# Patient Record
Sex: MALE | Employment: FULL TIME | URBAN - METROPOLITAN AREA
[De-identification: names, ages, dates, MRNs, and addresses within clinical notes are randomized per-mention and may not be internally consistent; named-entity substitution may affect disease eponyms.]

---

## 2019-09-07 ENCOUNTER — HOSPITAL ENCOUNTER (INPATIENT)
Age: 64
LOS: 13 days | Discharge: HOME HEALTH | DRG: 052 | End: 2019-09-20
Attending: EMERGENCY MEDICINE | Admitting: FAMILY MEDICINE
Payer: MEDICAID

## 2019-09-07 ENCOUNTER — APPOINTMENT (OUTPATIENT)
Dept: GENERAL RADIOLOGY | Age: 64
DRG: 052 | End: 2019-09-07
Attending: FAMILY MEDICINE
Payer: MEDICAID

## 2019-09-07 ENCOUNTER — APPOINTMENT (OUTPATIENT)
Dept: GENERAL RADIOLOGY | Age: 64
DRG: 052 | End: 2019-09-07
Attending: EMERGENCY MEDICINE
Payer: MEDICAID

## 2019-09-07 ENCOUNTER — APPOINTMENT (OUTPATIENT)
Dept: CT IMAGING | Age: 64
DRG: 052 | End: 2019-09-07
Attending: INTERNAL MEDICINE
Payer: MEDICAID

## 2019-09-07 ENCOUNTER — APPOINTMENT (OUTPATIENT)
Dept: VASCULAR SURGERY | Age: 64
DRG: 052 | End: 2019-09-07
Attending: EMERGENCY MEDICINE
Payer: MEDICAID

## 2019-09-07 ENCOUNTER — APPOINTMENT (OUTPATIENT)
Dept: CT IMAGING | Age: 64
DRG: 052 | End: 2019-09-07
Attending: EMERGENCY MEDICINE
Payer: MEDICAID

## 2019-09-07 DIAGNOSIS — M10.9 ACUTE GOUT OF RIGHT HAND, UNSPECIFIED CAUSE: ICD-10-CM

## 2019-09-07 DIAGNOSIS — G93.41 METABOLIC ENCEPHALOPATHY: ICD-10-CM

## 2019-09-07 DIAGNOSIS — I82.4Y9 DEEP VEIN THROMBOSIS (DVT) OF PROXIMAL LOWER EXTREMITY, UNSPECIFIED CHRONICITY, UNSPECIFIED LATERALITY (HCC): ICD-10-CM

## 2019-09-07 DIAGNOSIS — A41.9 SEPSIS, DUE TO UNSPECIFIED ORGANISM: Primary | ICD-10-CM

## 2019-09-07 LAB
ALBUMIN SERPL-MCNC: 2.3 G/DL (ref 3.4–5)
ALBUMIN/GLOB SERPL: 0.4 {RATIO} (ref 0.8–1.7)
ALP SERPL-CCNC: 100 U/L (ref 45–117)
ALT SERPL-CCNC: 33 U/L (ref 16–61)
ANION GAP SERPL CALC-SCNC: 13 MMOL/L (ref 3–18)
APPEARANCE UR: ABNORMAL
AST SERPL-CCNC: 47 U/L (ref 10–38)
BACTERIA URNS QL MICRO: ABNORMAL /HPF
BASOPHILS # BLD: 0 K/UL (ref 0–0.06)
BASOPHILS NFR BLD: 0 % (ref 0–3)
BILIRUB SERPL-MCNC: 1.5 MG/DL (ref 0.2–1)
BILIRUB UR QL: ABNORMAL
BUN SERPL-MCNC: 51 MG/DL (ref 7–18)
BUN/CREAT SERPL: 22 (ref 12–20)
CALCIUM SERPL-MCNC: 9.8 MG/DL (ref 8.5–10.1)
CHLORIDE SERPL-SCNC: 100 MMOL/L (ref 100–111)
CK MB CFR SERPL CALC: 0.5 % (ref 0–4)
CK MB SERPL-MCNC: 1.6 NG/ML (ref 5–25)
CK SERPL-CCNC: 292 U/L (ref 39–308)
CO2 SERPL-SCNC: 24 MMOL/L (ref 21–32)
COLOR UR: ABNORMAL
CREAT SERPL-MCNC: 2.31 MG/DL (ref 0.6–1.3)
DIFFERENTIAL METHOD BLD: ABNORMAL
EOSINOPHIL # BLD: 0 K/UL (ref 0–0.4)
EOSINOPHIL NFR BLD: 0 % (ref 0–5)
EPITH CASTS URNS QL MICRO: ABNORMAL /LPF (ref 0–5)
ERYTHROCYTE [DISTWIDTH] IN BLOOD BY AUTOMATED COUNT: 15.2 % (ref 11.6–14.5)
GLOBULIN SER CALC-MCNC: 6.5 G/DL (ref 2–4)
GLUCOSE BLD STRIP.AUTO-MCNC: 151 MG/DL (ref 70–110)
GLUCOSE SERPL-MCNC: 150 MG/DL (ref 74–99)
GLUCOSE UR STRIP.AUTO-MCNC: NEGATIVE MG/DL
HCT VFR BLD AUTO: 24.7 % (ref 36–48)
HGB BLD-MCNC: 8.1 G/DL (ref 13–16)
HGB UR QL STRIP: NEGATIVE
HYALINE CASTS URNS QL MICRO: ABNORMAL /LPF (ref 0–2)
KETONES UR QL STRIP.AUTO: NEGATIVE MG/DL
LACTATE BLD-SCNC: 1.19 MMOL/L (ref 0.4–2)
LACTATE BLD-SCNC: 2.58 MMOL/L (ref 0.4–2)
LEUKOCYTE ESTERASE UR QL STRIP.AUTO: NEGATIVE
LYMPHOCYTES # BLD: 0.6 K/UL (ref 0.8–3.5)
LYMPHOCYTES NFR BLD: 3 % (ref 20–51)
MCH RBC QN AUTO: 28.5 PG (ref 24–34)
MCHC RBC AUTO-ENTMCNC: 32.8 G/DL (ref 31–37)
MCV RBC AUTO: 87 FL (ref 74–97)
MONOCYTES # BLD: 2.4 K/UL (ref 0–1)
MONOCYTES NFR BLD: 13 % (ref 2–9)
NEUTS SEG # BLD: 15.6 K/UL (ref 1.8–8)
NEUTS SEG NFR BLD: 84 % (ref 42–75)
NITRITE UR QL STRIP.AUTO: NEGATIVE
PH UR STRIP: 5 [PH] (ref 5–8)
PLATELET # BLD AUTO: 841 K/UL (ref 135–420)
PLATELET COMMENTS,PCOM: ABNORMAL
PMV BLD AUTO: 10.1 FL (ref 9.2–11.8)
POTASSIUM SERPL-SCNC: 5.3 MMOL/L (ref 3.5–5.5)
PROT SERPL-MCNC: 8.8 G/DL (ref 6.4–8.2)
PROT UR STRIP-MCNC: 30 MG/DL
RBC # BLD AUTO: 2.84 M/UL (ref 4.7–5.5)
RBC #/AREA URNS HPF: ABNORMAL /HPF (ref 0–5)
RBC MORPH BLD: ABNORMAL
SODIUM SERPL-SCNC: 137 MMOL/L (ref 136–145)
SP GR UR REFRACTOMETRY: 1.02 (ref 1–1.03)
TROPONIN I SERPL-MCNC: <0.02 NG/ML (ref 0–0.04)
UROBILINOGEN UR QL STRIP.AUTO: 4 EU/DL (ref 0.2–1)
WBC # BLD AUTO: 18.6 K/UL (ref 4.6–13.2)
WBC URNS QL MICRO: ABNORMAL /HPF (ref 0–4)

## 2019-09-07 PROCEDURE — 80053 COMPREHEN METABOLIC PANEL: CPT

## 2019-09-07 PROCEDURE — 96365 THER/PROPH/DIAG IV INF INIT: CPT

## 2019-09-07 PROCEDURE — 93005 ELECTROCARDIOGRAM TRACING: CPT

## 2019-09-07 PROCEDURE — 74011250636 HC RX REV CODE- 250/636: Performed by: EMERGENCY MEDICINE

## 2019-09-07 PROCEDURE — 74011000258 HC RX REV CODE- 258: Performed by: EMERGENCY MEDICINE

## 2019-09-07 PROCEDURE — 74011250637 HC RX REV CODE- 250/637: Performed by: EMERGENCY MEDICINE

## 2019-09-07 PROCEDURE — 71045 X-RAY EXAM CHEST 1 VIEW: CPT

## 2019-09-07 PROCEDURE — 70450 CT HEAD/BRAIN W/O DYE: CPT

## 2019-09-07 PROCEDURE — 74011250636 HC RX REV CODE- 250/636: Performed by: INTERNAL MEDICINE

## 2019-09-07 PROCEDURE — 30233N1 TRANSFUSION OF NONAUTOLOGOUS RED BLOOD CELLS INTO PERIPHERAL VEIN, PERCUTANEOUS APPROACH: ICD-10-PCS | Performed by: EMERGENCY MEDICINE

## 2019-09-07 PROCEDURE — 83605 ASSAY OF LACTIC ACID: CPT

## 2019-09-07 PROCEDURE — 71250 CT THORAX DX C-: CPT

## 2019-09-07 PROCEDURE — 73700 CT LOWER EXTREMITY W/O DYE: CPT

## 2019-09-07 PROCEDURE — 99285 EMERGENCY DEPT VISIT HI MDM: CPT

## 2019-09-07 PROCEDURE — 93971 EXTREMITY STUDY: CPT

## 2019-09-07 PROCEDURE — 85025 COMPLETE CBC W/AUTO DIFF WBC: CPT

## 2019-09-07 PROCEDURE — 96360 HYDRATION IV INFUSION INIT: CPT

## 2019-09-07 PROCEDURE — 96366 THER/PROPH/DIAG IV INF ADDON: CPT

## 2019-09-07 PROCEDURE — 87040 BLOOD CULTURE FOR BACTERIA: CPT

## 2019-09-07 PROCEDURE — 82962 GLUCOSE BLOOD TEST: CPT

## 2019-09-07 PROCEDURE — 81001 URINALYSIS AUTO W/SCOPE: CPT

## 2019-09-07 PROCEDURE — 96361 HYDRATE IV INFUSION ADD-ON: CPT

## 2019-09-07 PROCEDURE — 96375 TX/PRO/DX INJ NEW DRUG ADDON: CPT

## 2019-09-07 PROCEDURE — 71046 X-RAY EXAM CHEST 2 VIEWS: CPT

## 2019-09-07 PROCEDURE — 65660000000 HC RM CCU STEPDOWN

## 2019-09-07 PROCEDURE — 82550 ASSAY OF CK (CPK): CPT

## 2019-09-07 RX ORDER — SODIUM CHLORIDE 0.9 % (FLUSH) 0.9 %
5-10 SYRINGE (ML) INJECTION AS NEEDED
Status: DISCONTINUED | OUTPATIENT
Start: 2019-09-07 | End: 2019-09-20 | Stop reason: HOSPADM

## 2019-09-07 RX ORDER — OXYCODONE AND ACETAMINOPHEN 5; 325 MG/1; MG/1
1 TABLET ORAL
Status: COMPLETED | OUTPATIENT
Start: 2019-09-07 | End: 2019-09-07

## 2019-09-07 RX ORDER — FUROSEMIDE 40 MG/1
40 TABLET ORAL 2 TIMES DAILY
COMMUNITY
End: 2019-09-20

## 2019-09-07 RX ORDER — LISINOPRIL 20 MG/1
20 TABLET ORAL DAILY
COMMUNITY
End: 2019-09-20

## 2019-09-07 RX ORDER — ONDANSETRON 2 MG/ML
4 INJECTION INTRAMUSCULAR; INTRAVENOUS
Status: DISCONTINUED | OUTPATIENT
Start: 2019-09-07 | End: 2019-09-20 | Stop reason: HOSPADM

## 2019-09-07 RX ORDER — TRAMADOL HYDROCHLORIDE 50 MG/1
50 TABLET ORAL
Status: ON HOLD | COMMUNITY
End: 2019-09-18 | Stop reason: SDUPTHER

## 2019-09-07 RX ORDER — CARVEDILOL 25 MG/1
25 TABLET ORAL 2 TIMES DAILY WITH MEALS
COMMUNITY
End: 2019-09-20

## 2019-09-07 RX ORDER — VANCOMYCIN/0.9 % SOD CHLORIDE 1 G/100 ML
1000 PLASTIC BAG, INJECTION (ML) INTRAVENOUS
Status: COMPLETED | OUTPATIENT
Start: 2019-09-07 | End: 2019-09-07

## 2019-09-07 RX ORDER — ACETAMINOPHEN 325 MG/1
650 TABLET ORAL
Status: DISCONTINUED | OUTPATIENT
Start: 2019-09-07 | End: 2019-09-20 | Stop reason: HOSPADM

## 2019-09-07 RX ADMIN — OXYCODONE HYDROCHLORIDE AND ACETAMINOPHEN 1 TABLET: 5; 325 TABLET ORAL at 16:11

## 2019-09-07 RX ADMIN — SODIUM CHLORIDE 250 ML: 9 INJECTION, SOLUTION INTRAVENOUS at 14:59

## 2019-09-07 RX ADMIN — VANCOMYCIN HYDROCHLORIDE 1000 MG: 10 INJECTION, POWDER, LYOPHILIZED, FOR SOLUTION INTRAVENOUS at 15:39

## 2019-09-07 RX ADMIN — SODIUM CHLORIDE 1000 ML: 900 INJECTION, SOLUTION INTRAVENOUS at 19:55

## 2019-09-07 RX ADMIN — SODIUM CHLORIDE 1000 ML: 900 INJECTION, SOLUTION INTRAVENOUS at 23:11

## 2019-09-07 RX ADMIN — SODIUM CHLORIDE 1000 ML: 900 INJECTION, SOLUTION INTRAVENOUS at 21:58

## 2019-09-07 RX ADMIN — PIPERACILLIN AND TAZOBACTAM 4.5 G: 4; .5 INJECTION, POWDER, LYOPHILIZED, FOR SOLUTION INTRAVENOUS; PARENTERAL at 15:08

## 2019-09-07 RX ADMIN — SODIUM CHLORIDE 1000 ML: 900 INJECTION, SOLUTION INTRAVENOUS at 20:45

## 2019-09-07 NOTE — ED TRIAGE NOTES
Pt arrived via ems from group [facility] [place].  Unconscious upon arrival, once the pt was being transferred to the stretcher he woke up and a/o X 4

## 2019-09-07 NOTE — ED PROVIDER NOTES
EMERGENCY DEPARTMENT HISTORY AND PHYSICAL EXAM    3:10 PM  Date: 9/7/2019  Patient Name: Glyn Krabbe    History of Presenting Illness     Chief Complaint   Patient presents with    Altered mental status       History Provided By: Patient and EMS    HPI: Glyn Krabbe is a 60 y.o. male with past medical history of MICK, DVT on Eliquis PE, CKD, CHF presents with an episode of unresponsiveness this morning from group home patient was found well by EMS however his mental status awake waxing and waning according to the patient he is he knows his name and the year but he does not know where he is. Easily he was hypertensive in the emergency department. Denies any pain. Limited history from patient, nurse called group home but unable to obtain more info. PCP: None    Past History     Past Medical History:  No past medical history on file. Past Surgical History:  No past surgical history on file. Family History:  No family history on file. Social History:  Social History     Tobacco Use    Smoking status: Not on file   Substance Use Topics    Alcohol use: Not on file    Drug use: Not on file       Allergies:  No Known Allergies    Review of Systems   Review of Systems   Constitutional: Negative for activity change, appetite change and chills. HENT: Negative for congestion, ear discharge, ear pain and sore throat. Eyes: Negative for photophobia and pain. Respiratory: Negative for cough and choking. Cardiovascular: Negative for palpitations and leg swelling. Gastrointestinal: Negative for anal bleeding and rectal pain. Endocrine: Negative for polydipsia and polyuria. Genitourinary: Negative for genital sores and urgency. Musculoskeletal: Negative for arthralgias and myalgias. Neurological: Negative for dizziness, seizures and speech difficulty. Psychiatric/Behavioral: Positive for confusion. Negative for hallucinations, self-injury and suicidal ideas.         Physical Exam Patient Vitals for the past 12 hrs:   Temp Pulse Resp BP SpO2   09/08/19 1938 97.2 °F (36.2 °C) 76 18 104/62 98 %   09/08/19 1438 97.6 °F (36.4 °C) 76 18 123/68 98 %   09/08/19 1415 97.4 °F (36.3 °C) 77 18 127/74 99 %   09/08/19 1311 97.4 °F (36.3 °C) 74 16 102/59 99 %   09/08/19 1248 98.7 °F (37.1 °C) 75 16 98/60 98 %   09/08/19 1215 97.3 °F (36.3 °C) 70 16 117/67 96 %   09/08/19 1202 97 °F (36.1 °C) 70 16 100/60 95 %   09/08/19 1146 98.8 °F (37.1 °C) 71 16 107/62 97 %   09/08/19 1124 98.5 °F (36.9 °C) 72 16 100/59 96 %       Physical Exam   Constitutional: He appears well-developed and well-nourished. HENT:   Head: Normocephalic and atraumatic. Eyes: Right eye exhibits no discharge. Left eye exhibits no discharge. Neck: Normal range of motion. Neck supple. Cardiovascular: Normal rate, regular rhythm, normal heart sounds and intact distal pulses. No murmur heard. Pulmonary/Chest: Effort normal and breath sounds normal. No stridor. No respiratory distress. He has no wheezes. He has no rales. He exhibits no tenderness. Abdominal: Soft. Bowel sounds are normal. He exhibits no distension. There is no tenderness. There is no rebound and no guarding. Musculoskeletal: Normal range of motion. He exhibits edema. R calf hard and edematous  Mass on medial thigh- deep 10*10   Neurological: He is alert. No cranial nerve deficit. Intermittently confused   Skin: Skin is warm and dry. Psychiatric: He has a normal mood and affect. Nursing note and vitals reviewed.       Diagnostic Study Results     Labs -  Recent Results (from the past 12 hour(s))   PROTHROMBIN TIME + INR    Collection Time: 09/08/19  4:01 PM   Result Value Ref Range    Prothrombin time 18.1 (H) 11.5 - 15.2 sec    INR 1.5 (H) 0.8 - 1.2     HGB & HCT    Collection Time: 09/08/19  4:01 PM   Result Value Ref Range    HGB 7.1 (L) 13.0 - 16.0 g/dL    HCT 22.6 (L) 36.0 - 48.0 %   URINALYSIS W/MICROSCOPIC    Collection Time: 09/08/19  4:01 PM Result Value Ref Range    Color DARK YELLOW      Appearance CLEAR      Specific gravity 1.013 1.005 - 1.030      pH (UA) 5.0 5.0 - 8.0      Protein NEGATIVE  NEG mg/dL    Glucose NEGATIVE  NEG mg/dL    Ketone NEGATIVE  NEG mg/dL    Bilirubin NEGATIVE  NEG      Blood NEGATIVE  NEG      Urobilinogen 2.0 (H) 0.2 - 1.0 EU/dL    Nitrites NEGATIVE  NEG      Leukocyte Esterase NEGATIVE  NEG      WBC PENDING /hpf    RBC PENDING /hpf    Epithelial cells PENDING /lpf    Bacteria PENDING /hpf   CHLORIDE, URINE RANDOM    Collection Time: 09/08/19  4:01 PM   Result Value Ref Range    Chloride,urine random 73 55 - 125 MMOL/L   SODIUM, UR, RANDOM    Collection Time: 09/08/19  4:01 PM   Result Value Ref Range    Sodium,urine random 76 20 - 110 MMOL/L   CREATININE, UR, RANDOM    Collection Time: 09/08/19  4:01 PM   Result Value Ref Range    Creatinine, urine 71.00 30 - 125 mg/dL       Radiologic Studies -       Medical Decision Making     ED Course: Progress Notes, Reevaluation, and Consults:    3:10 PM Initial assessment performed. The patients presenting problems have been discussed, and they/their family are in agreement with the care plan formulated and outlined with them. I have encouraged them to ask questions as they arise throughout their visit. Provider Notes (Medical Decision Making): Patient initially hypotensive on arrival.  Improved after 250 mL's of IV fluids. However he is intermittently confused. We are going to do a septic screen also we are going to check for his right leg for DVT. Will initiate antibiotic treatment and do a CT of his head   Patient with elevated WCC of 18 and lactic acid. Unclear source; CT head no acute intracranial abnormality  R DVT- of indeterminate age, patient already on eliquis  Medial thigh mass- hemorrhagic vs abscess  Patient will be admitted for sepsis and metabolic encephalopathy  Dr. Darling Erickson aware  Vital Signs-Reviewed the patient's vital signs. Reviewed pt's pulse ox reading. EKG:  Interpreted by the EP. Time Interpreted: 14:21   Rate: 79   Rhythm: NSR   Interpretation:LVH, no ST changes      Records Reviewed: old medical records(Time of Review: 3:10 PM)  -I am the first provider for this patient.  -I reviewed the vital signs, available nursing notes, past medical history, past surgical history, family history and social history. Current Facility-Administered Medications   Medication Dose Route Frequency Provider Last Rate Last Dose    0.9% sodium chloride infusion  75 mL/hr IntraVENous CONTINUOUS Wandy Fletcher MD 75 mL/hr at 09/08/19 1445 75 mL/hr at 09/08/19 1445    0.9% sodium chloride infusion 250 mL  250 mL IntraVENous PRN Wandy Fletcher MD   Stopped at 09/08/19 1445    cefepime (MAXIPIME) 2 g in sterile water (preservative free) 10 mL IV syringe  2 g IntraVENous Q12H Sherlyn Paredes MD   2 g at 09/08/19 2130    sodium chloride (NS) flush 5-10 mL  5-10 mL IntraVENous PRN Sanna Jules MD        acetaminophen (TYLENOL) tablet 650 mg  650 mg Oral Q4H PRN Nupur Richardson MD   650 mg at 09/08/19 1445    ondansetron (ZOFRAN) injection 4 mg  4 mg IntraVENous Q4H PRN Nupur Richardson MD        VANCOMYCIN INFORMATION NOTE   Other CONTINUOUS Nupur Richardson MD            Clinical Impression     Clinical Impression:   1. Sepsis, due to unspecified organism ([facility] [address]    2. Metabolic encephalopathy    3.  Deep vein thrombosis (DVT) of proximal lower extremity, unspecified chronicity, unspecified laterality ([facility] [address]        Disposition: Zamzam Bruce MD  3:10 PM

## 2019-09-07 NOTE — ED NOTES
Patient BP checked twice, Both readings low. Hospitalist paged. Spoke with ED doc and informed of BP ok to give bolus of IV fluids.

## 2019-09-08 ENCOUNTER — APPOINTMENT (OUTPATIENT)
Dept: GENERAL RADIOLOGY | Age: 64
DRG: 052 | End: 2019-09-08
Attending: HOSPITALIST
Payer: MEDICAID

## 2019-09-08 LAB
ANION GAP SERPL CALC-SCNC: 12 MMOL/L (ref 3–18)
ATRIAL RATE: 79 BPM
BASOPHILS # BLD: 0 K/UL (ref 0–0.1)
BASOPHILS NFR BLD: 0 % (ref 0–2)
BUN SERPL-MCNC: 61 MG/DL (ref 7–18)
BUN/CREAT SERPL: 25 (ref 12–20)
CALCIUM SERPL-MCNC: 8.6 MG/DL (ref 8.5–10.1)
CALCULATED P AXIS, ECG09: 20 DEGREES
CALCULATED R AXIS, ECG10: -5 DEGREES
CALCULATED T AXIS, ECG11: -3 DEGREES
CHLORIDE SERPL-SCNC: 107 MMOL/L (ref 100–111)
CHLORIDE UR-SCNC: 73 MMOL/L (ref 55–125)
CK SERPL-CCNC: 611 U/L (ref 39–308)
CO2 SERPL-SCNC: 23 MMOL/L (ref 21–32)
CREAT SERPL-MCNC: 2.45 MG/DL (ref 0.6–1.3)
CREAT UR-MCNC: 71 MG/DL (ref 30–125)
DIAGNOSIS, 93000: NORMAL
DIFFERENTIAL METHOD BLD: ABNORMAL
EOSINOPHIL # BLD: 0.1 K/UL (ref 0–0.4)
EOSINOPHIL NFR BLD: 1 % (ref 0–5)
ERYTHROCYTE [DISTWIDTH] IN BLOOD BY AUTOMATED COUNT: 15.7 % (ref 11.6–14.5)
FERRITIN SERPL-MCNC: 598 NG/ML (ref 8–388)
GLUCOSE SERPL-MCNC: 110 MG/DL (ref 74–99)
HBA1C MFR BLD: <3.5 % (ref 4.2–5.6)
HCT VFR BLD AUTO: 20.2 % (ref 36–48)
HCT VFR BLD AUTO: 22.6 % (ref 36–48)
HGB BLD-MCNC: 6.5 G/DL (ref 13–16)
HGB BLD-MCNC: 7.1 G/DL (ref 13–16)
INR PPP: 1.5 (ref 0.8–1.2)
IRON SATN MFR SERPL: 9 %
IRON SERPL-MCNC: 12 UG/DL (ref 50–175)
LYMPHOCYTES # BLD: 1.7 K/UL (ref 0.9–3.6)
LYMPHOCYTES NFR BLD: 11 % (ref 21–52)
MCH RBC QN AUTO: 28.3 PG (ref 24–34)
MCHC RBC AUTO-ENTMCNC: 32.2 G/DL (ref 31–37)
MCV RBC AUTO: 87.8 FL (ref 74–97)
MONOCYTES # BLD: 2.1 K/UL (ref 0.05–1.2)
MONOCYTES NFR BLD: 13 % (ref 3–10)
NEUTS SEG # BLD: 12.1 K/UL (ref 1.8–8)
NEUTS SEG NFR BLD: 75 % (ref 40–73)
P-R INTERVAL, ECG05: 122 MS
PLATELET # BLD AUTO: 698 K/UL (ref 135–420)
PMV BLD AUTO: 9.9 FL (ref 9.2–11.8)
POTASSIUM SERPL-SCNC: 5.1 MMOL/L (ref 3.5–5.5)
PROTHROMBIN TIME: 18.1 SEC (ref 11.5–15.2)
Q-T INTERVAL, ECG07: 380 MS
QRS DURATION, ECG06: 92 MS
QTC CALCULATION (BEZET), ECG08: 435 MS
RBC # BLD AUTO: 2.3 M/UL (ref 4.7–5.5)
SODIUM SERPL-SCNC: 142 MMOL/L (ref 136–145)
SODIUM UR-SCNC: 76 MMOL/L (ref 20–110)
T4 FREE SERPL-MCNC: 1.2 NG/DL (ref 0.7–1.5)
TIBC SERPL-MCNC: 135 UG/DL (ref 250–450)
TSH SERPL DL<=0.05 MIU/L-ACNC: 0.5 UIU/ML (ref 0.36–3.74)
VENTRICULAR RATE, ECG03: 79 BPM
WBC # BLD AUTO: 16.1 K/UL (ref 4.6–13.2)

## 2019-09-08 PROCEDURE — 65660000000 HC RM CCU STEPDOWN

## 2019-09-08 PROCEDURE — 36430 TRANSFUSION BLD/BLD COMPNT: CPT

## 2019-09-08 PROCEDURE — 85610 PROTHROMBIN TIME: CPT

## 2019-09-08 PROCEDURE — 80048 BASIC METABOLIC PNL TOTAL CA: CPT

## 2019-09-08 PROCEDURE — 36415 COLL VENOUS BLD VENIPUNCTURE: CPT

## 2019-09-08 PROCEDURE — 74011250637 HC RX REV CODE- 250/637: Performed by: FAMILY MEDICINE

## 2019-09-08 PROCEDURE — 82550 ASSAY OF CK (CPK): CPT

## 2019-09-08 PROCEDURE — 74011250636 HC RX REV CODE- 250/636: Performed by: EMERGENCY MEDICINE

## 2019-09-08 PROCEDURE — 83935 ASSAY OF URINE OSMOLALITY: CPT

## 2019-09-08 PROCEDURE — 85018 HEMOGLOBIN: CPT

## 2019-09-08 PROCEDURE — 74011250636 HC RX REV CODE- 250/636: Performed by: HOSPITALIST

## 2019-09-08 PROCEDURE — 74011000258 HC RX REV CODE- 258: Performed by: EMERGENCY MEDICINE

## 2019-09-08 PROCEDURE — 86900 BLOOD TYPING SEROLOGIC ABO: CPT

## 2019-09-08 PROCEDURE — 83540 ASSAY OF IRON: CPT

## 2019-09-08 PROCEDURE — 82570 ASSAY OF URINE CREATININE: CPT

## 2019-09-08 PROCEDURE — 84439 ASSAY OF FREE THYROXINE: CPT

## 2019-09-08 PROCEDURE — 74011000250 HC RX REV CODE- 250: Performed by: HOSPITALIST

## 2019-09-08 PROCEDURE — 81001 URINALYSIS AUTO W/SCOPE: CPT

## 2019-09-08 PROCEDURE — 86923 COMPATIBILITY TEST ELECTRIC: CPT

## 2019-09-08 PROCEDURE — 82728 ASSAY OF FERRITIN: CPT

## 2019-09-08 PROCEDURE — 74011250636 HC RX REV CODE- 250/636: Performed by: INTERNAL MEDICINE

## 2019-09-08 PROCEDURE — 83036 HEMOGLOBIN GLYCOSYLATED A1C: CPT

## 2019-09-08 PROCEDURE — 84443 ASSAY THYROID STIM HORMONE: CPT

## 2019-09-08 PROCEDURE — 85025 COMPLETE CBC W/AUTO DIFF WBC: CPT

## 2019-09-08 PROCEDURE — 84300 ASSAY OF URINE SODIUM: CPT

## 2019-09-08 PROCEDURE — 82436 ASSAY OF URINE CHLORIDE: CPT

## 2019-09-08 PROCEDURE — P9016 RBC LEUKOCYTES REDUCED: HCPCS

## 2019-09-08 PROCEDURE — 74011250636 HC RX REV CODE- 250/636: Performed by: FAMILY MEDICINE

## 2019-09-08 RX ORDER — SODIUM CHLORIDE 9 MG/ML
250 INJECTION, SOLUTION INTRAVENOUS AS NEEDED
Status: DISCONTINUED | OUTPATIENT
Start: 2019-09-08 | End: 2019-09-08 | Stop reason: SDUPTHER

## 2019-09-08 RX ORDER — SODIUM CHLORIDE 9 MG/ML
75 INJECTION, SOLUTION INTRAVENOUS CONTINUOUS
Status: DISPENSED | OUTPATIENT
Start: 2019-09-08 | End: 2019-09-09

## 2019-09-08 RX ORDER — FUROSEMIDE 10 MG/ML
20 INJECTION INTRAMUSCULAR; INTRAVENOUS ONCE
Status: COMPLETED | OUTPATIENT
Start: 2019-09-08 | End: 2019-09-08

## 2019-09-08 RX ORDER — SODIUM CHLORIDE 9 MG/ML
250 INJECTION, SOLUTION INTRAVENOUS AS NEEDED
Status: DISCONTINUED | OUTPATIENT
Start: 2019-09-08 | End: 2019-09-15 | Stop reason: SDUPTHER

## 2019-09-08 RX ADMIN — ACETAMINOPHEN 650 MG: 325 TABLET ORAL at 09:35

## 2019-09-08 RX ADMIN — ACETAMINOPHEN 650 MG: 325 TABLET ORAL at 14:45

## 2019-09-08 RX ADMIN — PIPERACILLIN SODIUM AND TAZOBACTAM SODIUM 3.38 G: 3; .375 INJECTION, POWDER, LYOPHILIZED, FOR SOLUTION INTRAVENOUS at 02:06

## 2019-09-08 RX ADMIN — VANCOMYCIN HYDROCHLORIDE 2500 MG: 1 INJECTION, POWDER, LYOPHILIZED, FOR SOLUTION INTRAVENOUS at 02:52

## 2019-09-08 RX ADMIN — SODIUM CHLORIDE 100 ML/HR: 900 INJECTION, SOLUTION INTRAVENOUS at 02:02

## 2019-09-08 RX ADMIN — PIPERACILLIN SODIUM AND TAZOBACTAM SODIUM 3.38 G: 3; .375 INJECTION, POWDER, LYOPHILIZED, FOR SOLUTION INTRAVENOUS at 14:45

## 2019-09-08 RX ADMIN — SODIUM CHLORIDE 250 ML: 900 INJECTION, SOLUTION INTRAVENOUS at 11:26

## 2019-09-08 RX ADMIN — FUROSEMIDE 20 MG: 10 INJECTION, SOLUTION INTRAMUSCULAR; INTRAVENOUS at 14:45

## 2019-09-08 RX ADMIN — PIPERACILLIN SODIUM AND TAZOBACTAM SODIUM 3.38 G: 3; .375 INJECTION, POWDER, LYOPHILIZED, FOR SOLUTION INTRAVENOUS at 09:28

## 2019-09-08 RX ADMIN — CEFEPIME 2 G: 2 INJECTION, POWDER, FOR SOLUTION INTRAVENOUS at 21:30
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
## 2019-09-08 NOTE — PROGRESS NOTES
Spoke to Dr. Tia Causey and made aware of lab results of H/H and INR. Made aware pt refused to go down for x-rays.

## 2019-09-08 NOTE — ROUTINE PROCESS
Bedside and Verbal shift change report given to AARON Last (oncoming nurse) by Lakshmi Sanchez RN (offgoing nurse). Report included the following information SBAR, Kardex, ED Summary, Procedure Summary, Intake/Output, MAR, Recent Results and Cardiac Rhythm NSR.

## 2019-09-08 NOTE — ROUTINE PROCESS
Bedside and Verbal shift change report given to Ondina Brooks RN (oncoming nurse) by Evelin Pina RN (offgoing nurse). Report included the following information SBAR, Kardex, MAR and Recent Results.

## 2019-09-08 NOTE — H&P
Hospitalist Admission Note    NAME: Dede Gonzalez   :  1955   MRN:  [id number]     Date:  2019     Patient PCP: None  ________________________________________________________________________    My assessment of this patient's clinical condition and my plan of care is as follows. Assessment / Plan:  1. Acute encephalopathy, etiology uncertain  2. Hypotension - dehydration vs infection vs medication effect vs other  3. Renal insufficiency, acute vs chronic  4. L thigh fluid collection - hematoma vs infection  5. Age indeterminate RLE DVT - nonocclusive  6. Anemia  7. Thrombocytosis   8. Hyperglycemia  9. On [address] therapy (Eliquis), reportedly for hx of DVT    1. Admit to telemetry bed. 2. IVF for hydration as patient appears dry. Will continue IVF at 100 ml/hr once bolus completed. 3. Empiric IV antibiotic therapy, follow cultures. 4. Despite RLE DVT which is likely chronic, will hold eliquis for now given concern for possible L thigh hematoma. F/U H/H, transfuse if necessary. 5. Thyryoid studies, A1c.  6. Consider upgrading status depending on overall clinical status, especially if hypotension remains an issue. 7. Disposition TBD. Code Status: Full  DVT Prophylaxis: On Eliquis  GI Prophylaxis: Not indicated          Subjective:   CHIEF COMPLAINT: Altered mental status    HISTORY OF PRESENT ILLNESS:     Susana Saucedo is a 60 y.o.  male who presented to the ED this afternoon for evaluation of mental status change. Patient remains confused on my exam and is oriented only to person - history is limited and difficult as a result. Per the ED record, patient was sent in from a local group home for evaluation after he was found to be outside of his normal level of cognition this AM.        PMHx:  Unable to obtain    PSurghx:  Unable to obtain       Social hx:  Unable to obtain     Family hx:  Unable to obtain:    Allergies:  NKDA but unable to verify.     Prior to Admission medications    Medication Sig Start Date End Date Taking? Authorizing Provider   carvedilol (COREG) 25 mg tablet Take 25 mg by mouth two (2) times daily (with meals). Yes Other, MD Pernell   traMADol (ULTRAM) 50 mg tablet Take 50 mg by mouth every six (6) hours as needed for Pain. Yes Other, MD Pernell   apixaban (ELIQUIS) 5 mg tablet Take 5 mg by mouth two (2) times a day. Yes Other, MD Pernell   lisinopril (PRINIVIL, ZESTRIL) 20 mg tablet Take 20 mg by mouth daily. Yes Ana María, MD Pernell   furosemide (LASIX) 40 mg tablet Take 40 mg by mouth daily. Yes Other, MD Pernell       REVIEW OF SYSTEMS:     I am not able to reliably complete the review of systems because: The patient is intubated and sedated   X The patient has altered mental status due to his acute medical problems    The patient has baseline aphasia from prior stroke(s)    The patient has baseline dementia and is not reliable historian    The patient is in acute medical distress and unable to provide information           Objective:   VITALS:    Visit Vitals  BP 98/76   Pulse 70   Temp 97.7 °F (36.5 °C)   Resp 20   SpO2 98%       PHYSICAL EXAM:    General:    In NAD. Nontoxic-appearing. HEENT: NCAT. Sclerae anicteric, EOMI. No oral ulcers, mucosa dry. Neck:  Supple, trachea midline. Lungs:   Clear, no wheezes. Effort nonlabored. Chest wall:  No tenderness  No Accessory muscle use. Heart:   RRR. Abdomen:   Soft, NTTP/ND. Extremities: Warm, bilateral LE 1+ edema. Skin:     Not pale. Not Jaundiced  No rashes   Psych:  Good insight. Not depressed. Not anxious or agitated. Neurologic: Awake but confused. Oriented to person only. Moves extremities spontaneously.     _______________________________________________________________________  Care Plan discussed with:    Comments   Patient     Family      RN X    Care Manager                    Consultant:      _______________________________________________________________________  Expected Disposition:   Home     HH/PT/OT/RN X   SNF/LTC X   DAR    ______________________________________________________________________      Tests/Procedures:   CT head:  IMPRESSION:     1. No CT evidence for an acute intracranial process. 2. Moderately severe degree of subcortical and periventricular white matter  lucencies. Finding is nonspecific and may represent that of small vessel  ischemic change. Other differential considerations may also include  demyelinating disease and vasculitis. CT R femur:  IMPRESSION:   1. Anteromedial fluid collection with fluid fluid collection. This could  represent hemorrhage or infection.      2. Severe right knee osteoarthritis with multiple loose bodies and  Chondrocalcinosis. RLE venous PVL:  Interpretation Summary     · Age indeterminate non-occlusive thrombus present in the right popliteal vein. · A large non-vascularized fluid collection noted in the mid medial thigh that extends to the distal medial thigh. · Technically difficult study due to body habitus. Lower Extremity Venous Findings     Right Lower Venous     Technically difficult exam due to: patient body habitus. Age indeterminate non-occlusive thrombus present in the right popliteal vein. The common femoral, greater saphenous, femoral, posterior tibial and peroneal vein(s) were imaged in the transverse and longitudinal planes. The vessels showed normal color filling and compressibility. Doppler interrogation of the veins showed phasic and spontaneous flow. A non-vascularized fluid collection noted in the mid medial thigh that extends to the distal medial thigh. Left Lower Venous     For comparison purposes, the left common femoral vein was briefly interrogated. These vein demonstrates normal color filing and compressibility. Doppler flow was phasic and spontaneous.              LAB DATA REVIEWED:    Recent Results (from the past 24 hour(s))   METABOLIC PANEL, COMPREHENSIVE Collection Time: 09/07/19  2:10 PM   Result Value Ref Range    Sodium 137 136 - 145 mmol/L    Potassium 5.3 3.5 - 5.5 mmol/L    Chloride 100 100 - 111 mmol/L    CO2 24 21 - 32 mmol/L    Anion gap 13 3.0 - 18 mmol/L    Glucose 150 (H) 74 - 99 mg/dL    BUN 51 (H) 7.0 - 18 MG/DL    Creatinine 2.31 (H) 0.6 - 1.3 MG/DL    BUN/Creatinine ratio 22 (H) 12 - 20      GFR est AA 35 (L) >60 ml/min/1.73m2    GFR est non-AA 29 (L) >60 ml/min/1.73m2    Calcium 9.8 8.5 - 10.1 MG/DL    Bilirubin, total 1.5 (H) 0.2 - 1.0 MG/DL    ALT (SGPT) 33 16 - 61 U/L    AST (SGOT) 47 (H) 10 - 38 U/L    Alk. phosphatase 100 45 - 117 U/L    Protein, total 8.8 (H) 6.4 - 8.2 g/dL    Albumin 2.3 (L) 3.4 - 5.0 g/dL    Globulin 6.5 (H) 2.0 - 4.0 g/dL    A-G Ratio 0.4 (L) 0.8 - 1.7     CBC WITH AUTOMATED DIFF    Collection Time: 09/07/19  2:10 PM   Result Value Ref Range    WBC 18.6 (H) 4.6 - 13.2 K/uL    RBC 2.84 (L) 4.70 - 5.50 M/uL    HGB 8.1 (L) 13.0 - 16.0 g/dL    HCT 24.7 (L) 36.0 - 48.0 %    MCV 87.0 74.0 - 97.0 FL    MCH 28.5 24.0 - 34.0 PG    MCHC 32.8 31.0 - 37.0 g/dL    RDW 15.2 (H) 11.6 - 14.5 %    PLATELET 044 (H) 675 - 420 K/uL    MPV 10.1 9.2 - 11.8 FL    NEUTROPHILS 84 (H) 42 - 75 %    LYMPHOCYTES 3 (L) 20 - 51 %    MONOCYTES 13 (H) 2 - 9 %    EOSINOPHILS 0 0 - 5 %    BASOPHILS 0 0 - 3 %    ABS. NEUTROPHILS 15.6 (H) 1.8 - 8.0 K/UL    ABS. LYMPHOCYTES 0.6 (L) 0.8 - 3.5 K/UL    ABS. MONOCYTES 2.4 (H) 0 - 1.0 K/UL    ABS. EOSINOPHILS 0.0 0.0 - 0.4 K/UL    ABS.  BASOPHILS 0.0 0.0 - 0.06 K/UL    DF MANUAL      PLATELET COMMENTS Increased Platelets      RBC COMMENTS POLYCHROMASIA  FEW  ANISOCYTOSIS  1+       CARDIAC PANEL,(CK, CKMB & TROPONIN)    Collection Time: 09/07/19  2:10 PM   Result Value Ref Range     39 - 308 U/L    CK - MB 1.6 <3.6 ng/ml    CK-MB Index 0.5 0.0 - 4.0 %    Troponin-I, QT <0.02 0.0 - 0.045 NG/ML   POC LACTIC ACID    Collection Time: 09/07/19  2:12 PM   Result Value Ref Range    Lactic Acid (POC) 2.58 (HH) 0.40 - 2.00 mmol/L   EKG, 12 LEAD, INITIAL    Collection Time: 09/07/19  2:18 PM   Result Value Ref Range    Ventricular Rate 79 BPM    Atrial Rate 79 BPM    P-R Interval 122 ms    QRS Duration 92 ms    Q-T Interval 380 ms    QTC Calculation (Bezet) 435 ms    Calculated P Axis 20 degrees    Calculated R Axis -5 degrees    Calculated T Axis -3 degrees    Diagnosis       Normal sinus rhythm  Possible Left atrial enlargement  Left ventricular hypertrophy  Abnormal ECG  No previous ECGs available     GLUCOSE, POC    Collection Time: 09/07/19  2:41 PM   Result Value Ref Range    Glucose (POC) 151 (H) 70 - 110 mg/dL   URINALYSIS W/ RFLX MICROSCOPIC    Collection Time: 09/07/19  4:00 PM   Result Value Ref Range    Color DARK YELLOW      Appearance CLOUDY      Specific gravity 1.017 1.005 - 1.030      pH (UA) 5.0 5.0 - 8.0      Protein 30 (A) NEG mg/dL    Glucose NEGATIVE  NEG mg/dL    Ketone NEGATIVE  NEG mg/dL    Bilirubin SMALL (A) NEG      Blood NEGATIVE  NEG      Urobilinogen 4.0 (H) 0.2 - 1.0 EU/dL    Nitrites NEGATIVE  NEG      Leukocyte Esterase NEGATIVE  NEG     URINE MICROSCOPIC ONLY    Collection Time: 09/07/19  4:00 PM   Result Value Ref Range    WBC 0 to 3 0 - 4 /hpf    RBC 0 to 3 0 - 5 /hpf    Epithelial cells 1+ 0 - 5 /lpf    Bacteria 2+ (A) NEG /hpf    Hyaline cast 0 to 3 0 - 2 /lpf   POC LACTIC ACID    Collection Time: 09/07/19  6:06 PM   Result Value Ref Range    Lactic Acid (POC) 1.19 0.40 - 2.00 mmol/L       Pee Oglesby MD

## 2019-09-08 NOTE — ED NOTES
TRANSFER - OUT REPORT:    Verbal report given to AARON Last(name) on Maria Arnold  being transferred to (unit) for routine progression of care       Report consisted of patients Situation, Background, Assessment and   Recommendations(SBAR). Information from the following report(s) SBAR, ED Summary, [facility] and Recent Results was reviewed with the receiving nurse. Lines:   Peripheral IV 70/65/99 Right Cephalic (Active)   Site Assessment Clean, dry, & intact 9/7/2019  2:22 PM   Phlebitis Assessment 0 9/7/2019  2:22 PM   Infiltration Assessment 0 9/7/2019  2:22 PM   Dressing Status Clean, dry, & intact 9/7/2019  2:22 PM   Dressing Type 4 X 4;Tape;Transparent 9/7/2019  2:22 PM   Hub Color/Line Status Pink;Flushed;Patent 9/7/2019  2:22 PM   Action Taken Blood drawn 9/7/2019  2:22 PM   Alcohol Cap Used Yes 9/7/2019  2:22 PM       Peripheral IV 09/07/19 Left Antecubital (Active)   Site Assessment Clean, dry, & intact 9/7/2019  2:56 PM   Phlebitis Assessment 0 9/7/2019  2:56 PM   Infiltration Assessment 0 9/7/2019  2:56 PM   Dressing Status Clean, dry, & intact 9/7/2019  2:56 PM   Dressing Type 4 X 4;Tape;Transparent 9/7/2019  2:56 PM   Hub Color/Line Status Pink;Flushed;Patent 9/7/2019  2:56 PM   Action Taken Blood drawn 9/7/2019  2:56 PM   Alcohol Cap Used Yes 9/7/2019  2:56 PM        Opportunity for questions and clarification was provided.       Patient transported with:   Registered Nurse

## 2019-09-08 NOTE — PROGRESS NOTES
[facility] Hospitalist Group  Progress Note    Patient: Cee Phelps Age: 60 y.o. : 1955 MR#: [id number] SSN: [id number]  Date: 2019     Subjective:   Right thigh and right knee pain with radiation to right hand/harm, discomfort. Left knee pain as well. States swelling of right side, arm and leg is better since his admission. Patient has confusion. Unable to recall where he lives and does not remember about taking any [address]. States he was recently admitted to [facility]. Assessment/Plan:   1. Acute encephalopathy, etiology uncertain  2. Anteromedial fluid collection with fluid collection. Hematoma v abscess  3. Acute DVT RLE, non occlusive  4. History of PE, saddle embolus and DVT  5. Acute anemia secondary to hematoma vs IVF   6. Thrombocytosis  7. Leukocytosis  8. Hyperglycemia, serum  9. HTN  10. Chronic systolic HF  11. CKD, unknown creatinine baseline or GFR    Plan  1. One unit PRBC ordered for today. Give on dose of lasix after blood transfusion if BP allows. Monitor HH   2. Vascular surgery consulted  3. Review of EMR cardiology discontinued all [address] 2019. Eliquis patient was unable to afford so switched to coumadin. Patient had compliance issues with following up with coumadin clinic. Last updated medication list 6/10/19. Coreg 25 mg twice daily  Furosemide 40 mg twice daily  Lisinopril 20 mg daily  Spironolactone 25 mg daily. Patient unable to recall his current medication list.   4. Nephrology consult for CKD. Unknown creatine and GFR baseline  5. a1c <3.5. At admission serum glucose 150. Today 110  6. IV abx, zosyn/vanco for leukocytosis. Cultures pending. 7. Cardiac medications on hold. Soft and hypotension this admission. HR 70's  8. Attempted to call son Loy Councilman, number on file not a working number. 9. Unknown which group home he was in.      Additional Notes:      Case discussed with:  [x]Patient  []Family  []Nursing  []Case Management  DVT Prophylaxis:  []Lovenox  []Hep SQ  []SCDs  []Coumadin   []On Heparin gtt    Objective:   VS:   Visit Vitals  /57 (BP 1 Location: Left arm, BP Patient Position: At rest)   Pulse 87   Temp 97.5 °F (36.4 °C)   Resp 16   Ht 6' 2\" (1.88 m)   Wt 131.5 kg (290 lb)   SpO2 98%   BMI 37.23 kg/m²      Tmax/24hrs: Temp (24hrs), Av.8 °F (36.6 °C), Min:97.4 °F (36.3 °C), Max:98.6 °F (37 °C)      Intake/Output Summary (Last 24 hours) at 2019 1107  Last data filed at 2019 0930  Gross per 24 hour   Intake 220 ml   Output 600 ml   Net -380 ml       General:  Alert, NAD  Cardiovascular:  RRR  Pulmonary:  LSC throughout; respiratory effort WNL  GI:  +BS in all four quadrants, soft, non-tender  Extremities: bilateral thigh circumference large in size. RUE edema   Neuro: alert to self but limited to self information      Labs:    Recent Results (from the past 24 hour(s))   CULTURE, BLOOD    Collection Time: 19  2:10 PM   Result Value Ref Range    Special Requests: NO SPECIAL REQUESTS      Culture result: NO GROWTH AFTER 15 HOURS     METABOLIC PANEL, COMPREHENSIVE    Collection Time: 19  2:10 PM   Result Value Ref Range    Sodium 137 136 - 145 mmol/L    Potassium 5.3 3.5 - 5.5 mmol/L    Chloride 100 100 - 111 mmol/L    CO2 24 21 - 32 mmol/L    Anion gap 13 3.0 - 18 mmol/L    Glucose 150 (H) 74 - 99 mg/dL    BUN 51 (H) 7.0 - 18 MG/DL    Creatinine 2.31 (H) 0.6 - 1.3 MG/DL    BUN/Creatinine ratio 22 (H) 12 - 20      GFR est AA 35 (L) >60 ml/min/1.73m2    GFR est non-AA 29 (L) >60 ml/min/1.73m2    Calcium 9.8 8.5 - 10.1 MG/DL    Bilirubin, total 1.5 (H) 0.2 - 1.0 MG/DL    ALT (SGPT) 33 16 - 61 U/L    AST (SGOT) 47 (H) 10 - 38 U/L    Alk.  phosphatase 100 45 - 117 U/L    Protein, total 8.8 (H) 6.4 - 8.2 g/dL    Albumin 2.3 (L) 3.4 - 5.0 g/dL    Globulin 6.5 (H) 2.0 - 4.0 g/dL    A-G Ratio 0.4 (L) 0.8 - 1.7     CBC WITH AUTOMATED DIFF    Collection Time: 19  2:10 PM   Result Value Ref Range    WBC 18.6 (H) 4.6 - 13.2 K/uL    RBC 2.84 (L) 4.70 - 5.50 M/uL    HGB 8.1 (L) 13.0 - 16.0 g/dL    HCT 24.7 (L) 36.0 - 48.0 %    MCV 87.0 74.0 - 97.0 FL    MCH 28.5 24.0 - 34.0 PG    MCHC 32.8 31.0 - 37.0 g/dL    RDW 15.2 (H) 11.6 - 14.5 %    PLATELET 068 (H) 271 - 420 K/uL    MPV 10.1 9.2 - 11.8 FL    NEUTROPHILS 84 (H) 42 - 75 %    LYMPHOCYTES 3 (L) 20 - 51 %    MONOCYTES 13 (H) 2 - 9 %    EOSINOPHILS 0 0 - 5 %    BASOPHILS 0 0 - 3 %    ABS. NEUTROPHILS 15.6 (H) 1.8 - 8.0 K/UL    ABS. LYMPHOCYTES 0.6 (L) 0.8 - 3.5 K/UL    ABS. MONOCYTES 2.4 (H) 0 - 1.0 K/UL    ABS. EOSINOPHILS 0.0 0.0 - 0.4 K/UL    ABS.  BASOPHILS 0.0 0.0 - 0.06 K/UL    DF MANUAL      PLATELET COMMENTS Increased Platelets      RBC COMMENTS POLYCHROMASIA  FEW  ANISOCYTOSIS  1+       CARDIAC PANEL,(CK, CKMB & TROPONIN)    Collection Time: 09/07/19  2:10 PM   Result Value Ref Range     39 - 308 U/L    CK - MB 1.6 <3.6 ng/ml    CK-MB Index 0.5 0.0 - 4.0 %    Troponin-I, QT <0.02 0.0 - 0.045 NG/ML   POC LACTIC ACID    Collection Time: 09/07/19  2:12 PM   Result Value Ref Range    Lactic Acid (POC) 2.58 (HH) 0.40 - 2.00 mmol/L   EKG, 12 LEAD, INITIAL    Collection Time: 09/07/19  2:18 PM   Result Value Ref Range    Ventricular Rate 79 BPM    Atrial Rate 79 BPM    P-R Interval 122 ms    QRS Duration 92 ms    Q-T Interval 380 ms    QTC Calculation (Bezet) 435 ms    Calculated P Axis 20 degrees    Calculated R Axis -5 degrees    Calculated T Axis -3 degrees    Diagnosis       Normal sinus rhythm  Possible Left atrial enlargement  Left ventricular hypertrophy  Abnormal ECG  No previous ECGs available     CULTURE, BLOOD    Collection Time: 09/07/19  2:25 PM   Result Value Ref Range    Special Requests: NO SPECIAL REQUESTS      Culture result: NO GROWTH AFTER 15 HOURS     GLUCOSE, POC    Collection Time: 09/07/19  2:41 PM   Result Value Ref Range    Glucose (POC) 151 (H) 70 - 110 mg/dL   URINALYSIS W/ RFLX MICROSCOPIC    Collection Time: 09/07/19  4:00 PM   Result Value Ref Range    Color DARK YELLOW      Appearance CLOUDY      Specific gravity 1.017 1.005 - 1.030      pH (UA) 5.0 5.0 - 8.0      Protein 30 (A) NEG mg/dL    Glucose NEGATIVE  NEG mg/dL    Ketone NEGATIVE  NEG mg/dL    Bilirubin SMALL (A) NEG      Blood NEGATIVE  NEG      Urobilinogen 4.0 (H) 0.2 - 1.0 EU/dL    Nitrites NEGATIVE  NEG      Leukocyte Esterase NEGATIVE  NEG     URINE MICROSCOPIC ONLY    Collection Time: 09/07/19  4:00 PM   Result Value Ref Range    WBC 0 to 3 0 - 4 /hpf    RBC 0 to 3 0 - 5 /hpf    Epithelial cells 1+ 0 - 5 /lpf    Bacteria 2+ (A) NEG /hpf    Hyaline cast 0 to 3 0 - 2 /lpf   POC LACTIC ACID    Collection Time: 09/07/19  6:06 PM   Result Value Ref Range    Lactic Acid (POC) 1.19 0.40 - 6.43 mmol/L   METABOLIC PANEL, BASIC    Collection Time: 09/08/19  3:45 AM   Result Value Ref Range    Sodium 142 136 - 145 mmol/L    Potassium 5.1 3.5 - 5.5 mmol/L    Chloride 107 100 - 111 mmol/L    CO2 23 21 - 32 mmol/L    Anion gap 12 3.0 - 18 mmol/L    Glucose 110 (H) 74 - 99 mg/dL    BUN 61 (H) 7.0 - 18 MG/DL    Creatinine 2.45 (H) 0.6 - 1.3 MG/DL    BUN/Creatinine ratio 25 (H) 12 - 20      GFR est AA 33 (L) >60 ml/min/1.73m2    GFR est non-AA 27 (L) >60 ml/min/1.73m2    Calcium 8.6 8.5 - 10.1 MG/DL   CBC WITH AUTOMATED DIFF    Collection Time: 09/08/19  3:45 AM   Result Value Ref Range    WBC 16.1 (H) 4.6 - 13.2 K/uL    RBC 2.30 (L) 4.70 - 5.50 M/uL    HGB 6.5 (L) 13.0 - 16.0 g/dL    HCT 20.2 (L) 36.0 - 48.0 %    MCV 87.8 74.0 - 97.0 FL    MCH 28.3 24.0 - 34.0 PG    MCHC 32.2 31.0 - 37.0 g/dL    RDW 15.7 (H) 11.6 - 14.5 %    PLATELET 702 (H) 048 - 420 K/uL    MPV 9.9 9.2 - 11.8 FL    NEUTROPHILS 75 (H) 40 - 73 %    LYMPHOCYTES 11 (L) 21 - 52 %    MONOCYTES 13 (H) 3 - 10 %    EOSINOPHILS 1 0 - 5 %    BASOPHILS 0 0 - 2 %    ABS. NEUTROPHILS 12.1 (H) 1.8 - 8.0 K/UL    ABS. LYMPHOCYTES 1.7 0.9 - 3.6 K/UL    ABS. MONOCYTES 2.1 (H) 0.05 - 1.2 K/UL    ABS. EOSINOPHILS 0.1 0.0 - 0.4 K/UL    ABS. BASOPHILS 0.0 0.0 - 0.1 K/UL    DF AUTOMATED     TSH 3RD GENERATION    Collection Time: 09/08/19  3:45 AM   Result Value Ref Range    TSH 0.50 0.36 - 3.74 uIU/mL   T4, FREE    Collection Time: 09/08/19  3:45 AM   Result Value Ref Range    T4, Free 1.2 0.7 - 1.5 NG/DL   HEMOGLOBIN A1C W/O EAG    Collection Time: 09/08/19  3:45 AM   Result Value Ref Range    Hemoglobin A1c <3.5 (L) 4.2 - 5.6 %   TYPE + CROSSMATCH    Collection Time: 09/08/19  9:13 AM   Result Value Ref Range    Crossmatch Expiration 09/11/2019     ABO/Rh(D) O POSITIVE     Antibody screen NEG     CALLED TO: Kyle Matthew, 4N, [id number] AT 1051 BY [facility].      Unit number [id number]     Blood component type RC LR     Unit division 00     Status of unit ALLOCATED     Crossmatch result Compatible        Signed By: Umer Malin NP     September 8, 2019

## 2019-09-08 NOTE — PROGRESS NOTES
Low H/H   No signs of bleeding     Hx of multiple PEs includingf saddle PE in 2018  DVT LE   DHF   Acute on CR insuffiencey   On Eliquis which is held on admission     Will transfuse one unit   Continue Hydration  And decrease to 50 ml/h once transfusion starts   Lasix after transfusion if SBP > 120

## 2019-09-08 NOTE — PROGRESS NOTES
H&H 6.5/20. 2. Notified Dr. Vinny Rae, orders to transfuse 1 unit PRBC. Give lasix 20 mg IV one time if SBP > 120, and decrease IV fluid rate to 50 cc/ hr after transfusion. Patient agreeable with transfusion.

## 2019-09-09 ENCOUNTER — APPOINTMENT (OUTPATIENT)
Dept: GENERAL RADIOLOGY | Age: 64
DRG: 052 | End: 2019-09-09
Attending: HOSPITALIST
Payer: MEDICAID

## 2019-09-09 LAB
ANION GAP SERPL CALC-SCNC: 7 MMOL/L (ref 3–18)
APPEARANCE UR: CLEAR
BACTERIA URNS QL MICRO: ABNORMAL /HPF
BASOPHILS # BLD: 0 K/UL (ref 0–0.06)
BASOPHILS NFR BLD: 0 % (ref 0–3)
BILIRUB UR QL: NEGATIVE
BUN SERPL-MCNC: 54 MG/DL (ref 7–18)
BUN/CREAT SERPL: 28 (ref 12–20)
CALCIUM SERPL-MCNC: 9.1 MG/DL (ref 8.5–10.1)
CHLORIDE SERPL-SCNC: 108 MMOL/L (ref 100–111)
CK SERPL-CCNC: 537 U/L (ref 39–308)
CO2 SERPL-SCNC: 24 MMOL/L (ref 21–32)
COLOR UR: ABNORMAL
CREAT SERPL-MCNC: 1.9 MG/DL (ref 0.6–1.3)
DIFFERENTIAL METHOD BLD: ABNORMAL
EOSINOPHIL # BLD: 0.1 K/UL (ref 0–0.4)
EOSINOPHIL NFR BLD: 1 % (ref 0–5)
EPITH CASTS URNS QL MICRO: ABNORMAL /LPF (ref 0–5)
ERYTHROCYTE [DISTWIDTH] IN BLOOD BY AUTOMATED COUNT: 16.3 % (ref 11.6–14.5)
GLUCOSE SERPL-MCNC: 112 MG/DL (ref 74–99)
GLUCOSE UR STRIP.AUTO-MCNC: NEGATIVE MG/DL
HCT VFR BLD AUTO: 21.2 % (ref 36–48)
HCT VFR BLD AUTO: 21.7 % (ref 36–48)
HCT VFR BLD AUTO: 22.6 % (ref 36–48)
HGB BLD-MCNC: 6.8 G/DL (ref 13–16)
HGB BLD-MCNC: 7 G/DL (ref 13–16)
HGB BLD-MCNC: 7.2 G/DL (ref 13–16)
HGB UR QL STRIP: NEGATIVE
KETONES UR QL STRIP.AUTO: NEGATIVE MG/DL
LEUKOCYTE ESTERASE UR QL STRIP.AUTO: NEGATIVE
LYMPHOCYTES # BLD: 1.8 K/UL (ref 0.8–3.5)
LYMPHOCYTES NFR BLD: 14 % (ref 20–51)
MAGNESIUM SERPL-MCNC: 2.4 MG/DL (ref 1.6–2.6)
MCH RBC QN AUTO: 27.9 PG (ref 24–34)
MCHC RBC AUTO-ENTMCNC: 31.9 G/DL (ref 31–37)
MCV RBC AUTO: 87.6 FL (ref 74–97)
MONOCYTES # BLD: 1.2 K/UL (ref 0–1)
MONOCYTES NFR BLD: 9 % (ref 2–9)
NEUTS SEG # BLD: 10 K/UL (ref 1.8–8)
NEUTS SEG NFR BLD: 76 % (ref 42–75)
NITRITE UR QL STRIP.AUTO: NEGATIVE
OSMOLALITY UR: 385 MOSM/KG H2O (ref 50–1400)
PH UR STRIP: 5 [PH] (ref 5–8)
PLATELET # BLD AUTO: 725 K/UL (ref 135–420)
PLATELET COMMENTS,PCOM: ABNORMAL
PMV BLD AUTO: 9.5 FL (ref 9.2–11.8)
POTASSIUM SERPL-SCNC: 4.2 MMOL/L (ref 3.5–5.5)
PROT UR STRIP-MCNC: NEGATIVE MG/DL
RBC # BLD AUTO: 2.58 M/UL (ref 4.7–5.5)
RBC #/AREA URNS HPF: ABNORMAL /HPF (ref 0–5)
RBC MORPH BLD: ABNORMAL
SODIUM SERPL-SCNC: 139 MMOL/L (ref 136–145)
SP GR UR REFRACTOMETRY: 1.01 (ref 1–1.03)
URATE SERPL-MCNC: 8.7 MG/DL (ref 2.6–7.2)
UROBILINOGEN UR QL STRIP.AUTO: 2 EU/DL (ref 0.2–1)
VANCOMYCIN SERPL-MCNC: 14.3 UG/ML (ref 5–40)
VANCOMYCIN SERPL-MCNC: 20 UG/ML (ref 5–40)
WBC # BLD AUTO: 13.1 K/UL (ref 4.6–13.2)
WBC URNS QL MICRO: ABNORMAL /HPF (ref 0–4)

## 2019-09-09 PROCEDURE — 77030011256 HC DRSG MEPILEX <16IN NO BORD MOLN -A

## 2019-09-09 PROCEDURE — 74011250637 HC RX REV CODE- 250/637: Performed by: FAMILY MEDICINE

## 2019-09-09 PROCEDURE — 74011000258 HC RX REV CODE- 258: Performed by: INTERNAL MEDICINE

## 2019-09-09 PROCEDURE — 74011000250 HC RX REV CODE- 250: Performed by: HOSPITALIST

## 2019-09-09 PROCEDURE — 84550 ASSAY OF BLOOD/URIC ACID: CPT

## 2019-09-09 PROCEDURE — 77030037875 HC DRSG MEPILEX <16IN BORD MOLN -A

## 2019-09-09 PROCEDURE — 74011636637 HC RX REV CODE- 636/637: Performed by: HOSPITALIST

## 2019-09-09 PROCEDURE — 73560 X-RAY EXAM OF KNEE 1 OR 2: CPT

## 2019-09-09 PROCEDURE — 65660000000 HC RM CCU STEPDOWN

## 2019-09-09 PROCEDURE — 83735 ASSAY OF MAGNESIUM: CPT

## 2019-09-09 PROCEDURE — 74011250636 HC RX REV CODE- 250/636: Performed by: HOSPITALIST

## 2019-09-09 PROCEDURE — 77030029211 HC GEL MEDIH TU INLC -B

## 2019-09-09 PROCEDURE — 85018 HEMOGLOBIN: CPT

## 2019-09-09 PROCEDURE — 80048 BASIC METABOLIC PNL TOTAL CA: CPT

## 2019-09-09 PROCEDURE — 80202 ASSAY OF VANCOMYCIN: CPT

## 2019-09-09 PROCEDURE — 85025 COMPLETE CBC W/AUTO DIFF WBC: CPT

## 2019-09-09 PROCEDURE — 36415 COLL VENOUS BLD VENIPUNCTURE: CPT

## 2019-09-09 PROCEDURE — 74011250637 HC RX REV CODE- 250/637: Performed by: HOSPITALIST

## 2019-09-09 PROCEDURE — 73130 X-RAY EXAM OF HAND: CPT

## 2019-09-09 PROCEDURE — 82550 ASSAY OF CK (CPK): CPT

## 2019-09-09 RX ORDER — PREDNISONE 20 MG/1
40 TABLET ORAL
Status: DISCONTINUED | OUTPATIENT
Start: 2019-09-09 | End: 2019-09-11

## 2019-09-09 RX ORDER — FAMOTIDINE 20 MG/1
20 TABLET, FILM COATED ORAL 2 TIMES DAILY
Status: DISCONTINUED | OUTPATIENT
Start: 2019-09-09 | End: 2019-09-10

## 2019-09-09 RX ORDER — OXYCODONE AND ACETAMINOPHEN 5; 325 MG/1; MG/1
1-2 TABLET ORAL
Status: DISCONTINUED | OUTPATIENT
Start: 2019-09-09 | End: 2019-09-17

## 2019-09-09 RX ADMIN — FAMOTIDINE 20 MG: 20 TABLET ORAL at 17:03

## 2019-09-09 RX ADMIN — FAMOTIDINE 20 MG: 20 TABLET ORAL at 13:32

## 2019-09-09 RX ADMIN — ACETAMINOPHEN 650 MG: 325 TABLET ORAL at 04:27

## 2019-09-09 RX ADMIN — SODIUM CHLORIDE, SODIUM ACETATE ANHYDROUS, SODIUM GLUCONATE, POTASSIUM CHLORIDE, AND MAGNESIUM CHLORIDE: 526; 222; 502; 37; 30 INJECTION, SOLUTION INTRAVENOUS at 13:33

## 2019-09-09 RX ADMIN — OXYCODONE HYDROCHLORIDE AND ACETAMINOPHEN 2 TABLET: 5; 325 TABLET ORAL at 23:00

## 2019-09-09 RX ADMIN — ACETAMINOPHEN 650 MG: 325 TABLET ORAL at 09:56

## 2019-09-09 RX ADMIN — PREDNISONE 40 MG: 20 TABLET ORAL at 13:32

## 2019-09-09 RX ADMIN — CEFEPIME 2 G: 2 INJECTION, POWDER, FOR SOLUTION INTRAVENOUS at 09:56

## 2019-09-09 RX ADMIN — CEFEPIME 2 G: 2 INJECTION, POWDER, FOR SOLUTION INTRAVENOUS at 22:51

## 2019-09-09 RX ADMIN — OXYCODONE HYDROCHLORIDE AND ACETAMINOPHEN 2 TABLET: 5; 325 TABLET ORAL at 17:03

## 2019-09-09 RX ADMIN — ACETAMINOPHEN 650 MG: 325 TABLET ORAL at 00:25

## 2019-09-09 NOTE — ROUTINE PROCESS
Bedside and Verbal shift change report given to Eloy May RN (oncoming nurse) by Srikanth Herrera RN (offgoing nurse). Report included the following information SBAR, Kardex, MAR and Recent Results.

## 2019-09-09 NOTE — PROGRESS NOTES
Reason for Admission:  Sepsis ([facility] [address] [A41.9]                 RRAT Score:    4            Plan for utilizing home health:    none                      Likelihood of Readmission:   LOW                         Transition of Care Plan:              Initial assessment completed with patient. Cognitive status of patient: only aware of person, place and situation. Face sheet information confirmed:  yes. Patient states he has no family he wishes to designate as a part of his care team.  This patient lives in [facility] with Kanwal Chen ([phone]). Message left for Daisy. Patient is able to navigate steps as needed. Prior to hospitalization, patient was considered to be independent with ADLs/IADLS : yes . Patient has a current ACP document on file: no  The patient will need a Lyft to transport patient home upon discharge. The patient already has rolling walker and medical equipment available in the home. Patient is not currently active with home health. Patient has not stayed in a skilled nursing facility or rehab. This patient is on dialysis :no    Freedom of choice signed: no. Currently, the discharge plan is Group Home. The patient states that he can obtain his medications from the pharmacy, and take his medications as directed. Patient's current insurance is Self Pay       Care Management Interventions  PCP Verified by CM: No(free foundation application given to patient)  Mode of Transport at Discharge: Self  Transition of Care Consult (CM Consult): Group Home  Current Support Network:  Adult Group Home  Confirm Follow Up Transport: Other (see comment)(will need lyft)  Plan discussed with Pt/Family/Caregiver: Yes  Discharge Location  Discharge Placement: Group home        Junior El WALKER BSN  Case Management [phone]

## 2019-09-09 NOTE — PROGRESS NOTES
Problem: Falls - Risk of  Goal: *Absence of Falls  Description  Document Kamille Payment Fall Risk and appropriate interventions in the flowsheet. Outcome: Progressing Towards Goal  Note:   Fall Risk Interventions:  Mobility Interventions: Bed/chair exit alarm         Medication Interventions: Bed/chair exit alarm    Elimination Interventions: Bed/chair exit alarm, Call light in reach, Urinal in reach              Problem: Pressure Injury - Risk of  Goal: *Prevention of pressure injury  Description  Document Lukasz Scale and appropriate interventions in the flowsheet. Outcome: Progressing Towards Goal  Note:   Pressure Injury Interventions:             Activity Interventions: Pressure redistribution bed/mattress(bed type)    Mobility Interventions: Pressure redistribution bed/mattress (bed type)    Nutrition Interventions: Document food/fluid/supplement intake                     Problem: Anemia Care Plan (Adult and Pediatric)  Goal: *Labs within defined limits  Outcome: Progressing Towards Goal
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
## 2019-09-09 NOTE — PROGRESS NOTES
RENAL DAILY PROGRESS NOTE            61y M with PMH DM, DVT admittted for altered metnal status, seen for MICK  Subjective:     Complaint:   Overnight events noted  Complaining pain in his right hand   no nausea, vomiting, chest pain, short of breath, cough, seizure. IMPRESSION:   MICK, pre renal, was on diuretics and nsaids,  Anemia  Leucocytosis   DVT,   Gout  Right thigh swelling    PLAN:    His CK is not significantly elevated. Continue IV hydration. Very higher uric acid, consider trial of steroid. Monitor Hb. Discussed with Dr. Abdulaziz Goss      Current Facility-Administered Medications   Medication Dose Route Frequency    VANCOMYCIN TIMED RANDOM LEVEL DUE AT 14:00   Other ONCE    0.9% sodium chloride infusion 250 mL  250 mL IntraVENous PRN    cefepime (MAXIPIME) 2 g in sterile water (preservative free) 10 mL IV syringe  2 g IntraVENous Q12H    sodium chloride (NS) flush 5-10 mL  5-10 mL IntraVENous PRN    acetaminophen (TYLENOL) tablet 650 mg  650 mg Oral Q4H PRN    ondansetron (ZOFRAN) injection 4 mg  4 mg IntraVENous Q4H PRN    VANCOMYCIN INFORMATION NOTE   Other CONTINUOUS       Review of Symptoms: comprehensive ROS negative except above. Objective:     Patient Vitals for the past 24 hrs:   Temp Pulse Resp BP SpO2   09/09/19 1127 99.3 °F (37.4 °C) 76 20 108/58 96 %   09/09/19 0813 97.6 °F (36.4 °C) 79 20 96/60 96 %   09/09/19 0416 99 °F (37.2 °C) 75 18 109/67 97 %   09/09/19 0016 97.5 °F (36.4 °C) 77 18 113/65 97 %   09/08/19 1938 97.2 °F (36.2 °C) 76 18 104/62 98 %   09/08/19 1438 97.6 °F (36.4 °C) 76 18 123/68 98 %   09/08/19 1415 97.4 °F (36.3 °C) 77 18 127/74 99 %   09/08/19 1311 97.4 °F (36.3 °C) 74 16 102/59 99 %   09/08/19 1248 98.7 °F (37.1 °C) 75 16 98/60 98 %        Weight change: 2.812 kg (6 lb 3.2 oz)     09/07 1901 - 09/09 0700  In: 3238.3 [P.O.:1570;  I.V.:1368.3]  Out: 2025 [Urine:2025]    Intake/Output Summary (Last 24 hours) at 9/9/2019 1225  Last data filed at 9/9/2019 1018  Gross per 24 hour   Intake 2617.5 ml   Output 1850 ml   Net 767.5 ml     Physical Exam:   General: comfortable, no acute distress   HEENT sclera anicteric, supple neck, no thyromegaly  CVS: S1S2 heard,  no rub  RS: + air entry b/l,   Abd: Soft, Non tender,   Neuro: non focal, awake, alert , CN II-XII are grossly intact  Extrm: no edema, no cyanosis, clubbing   Skin: no visible  Rash  Musculoskeletal: No gross joints or bone deformities         Data Review:     LABS:   Hematology:   Recent Labs     09/09/19  0945 09/09/19  0010 09/08/19  1601 09/08/19  0345 09/07/19  1410   WBC 13.1  --   --  16.1* 18.6*   HGB 7.2* 7.0* 7.1* 6.5* 8.1*   HCT 22.6* 21.7* 22.6* 20.2* 24.7*     Chemistry:   Recent Labs     09/09/19  0945 09/08/19  0345 09/07/19  1410   BUN 54* 61* 51*   CREA 1.90* 2.45* 2.31*   CA 9.1 8.6 9.8   ALB  --   --  2.3*   K 4.2 5.1 5.3    142 137    107 100   CO2 24 23 24   * 110* 150*            Procedures/imaging: see electronic medical records for all procedures, Xrays and details which were not copied into this note but were reviewed prior to creation of Plan          Assessment & Plan:     As above         Master Mancia MD  9/9/2019  12:25 PM

## 2019-09-09 NOTE — PROGRESS NOTES
conducted an initial consultation and Spiritual Assessment for Brandon Antonio, who is a 60 y. o.,male. Patients Primary Language is: [place]. According to the patients EMR Denominational Affiliation is: No Congregation. The reason the Patient came to the hospital is:   Patient Active Problem List    Diagnosis Date Noted    Sepsis ([facility] 09/07/2019        The  provided the following Interventions:  Initiated a relationship of care and support. Listened empathically. Provided information about Spiritual Care Services. Offered prayer and assurance of continued prayers on patient's behalf. Chart reviewed. The following outcomes where achieved:  Patient shared limited information about both their medical narrative and spiritual journey/beliefs. Patient processed feeling about current hospitalization. Patient expressed gratitude for 's visit. Assessment:  Patient does not have any Confucianism/cultural needs that will affect patients preferences in health care. There are no spiritual or Confucianism issues which require intervention at this time. Plan:  Chaplains will continue to follow and will provide pastoral care on an as needed/requested basis.  recommends bedside caregivers page  on duty if patient shows signs of acute spiritual or emotional distress.     [address] [facility]   [phone]

## 2019-09-09 NOTE — ROUTINE PROCESS
Bedside and Verbal shift change report given to Adrian Zafar (oncoming nurse) by Heydi Gonzalez RN (offgoing nurse). Report included the following information SBAR, Kardex, ED Summary, Procedure Summary, Intake/Output, MAR, Recent Results and Cardiac Rhythm NSR.

## 2019-09-09 NOTE — PROGRESS NOTES
PT eval order received and chart reviewed. Unable to see patient at this time as he is awaiting Left knee and Right Hand XR. Will follow up as patient schedule allows.  Thank you for this referral. Elina Lo, PT, DPT
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
## 2019-09-09 NOTE — PROGRESS NOTES
OT order received and chart reviewed. Pt not seen for skilled OT due to:  []  Nausea/vomiting  []  Eating  []  Pain  []  Pt lethargic  []  Off Unit  [] Speaking with hospital staff member  [x] Other: Pt currently has orders for XR of R hand and L knee. Will await results and f/u later as schedule allows.      Thank you for this referral.  Sid Melgoza MS, OTR/L

## 2019-09-10 LAB
ANION GAP SERPL CALC-SCNC: 8 MMOL/L (ref 3–18)
BASOPHILS # BLD: 0 K/UL (ref 0–0.1)
BASOPHILS NFR BLD: 0 % (ref 0–2)
BUN SERPL-MCNC: 43 MG/DL (ref 7–18)
BUN/CREAT SERPL: 29 (ref 12–20)
CALCIUM SERPL-MCNC: 8.9 MG/DL (ref 8.5–10.1)
CHLORIDE SERPL-SCNC: 109 MMOL/L (ref 100–111)
CK SERPL-CCNC: 160 U/L (ref 39–308)
CO2 SERPL-SCNC: 22 MMOL/L (ref 21–32)
CREAT SERPL-MCNC: 1.5 MG/DL (ref 0.6–1.3)
DIFFERENTIAL METHOD BLD: ABNORMAL
EOSINOPHIL # BLD: 0 K/UL (ref 0–0.4)
EOSINOPHIL NFR BLD: 0 % (ref 0–5)
ERYTHROCYTE [DISTWIDTH] IN BLOOD BY AUTOMATED COUNT: 16.4 % (ref 11.6–14.5)
FOLATE SERPL-MCNC: 4.2 NG/ML (ref 3.1–17.5)
GLUCOSE SERPL-MCNC: 136 MG/DL (ref 74–99)
HCT VFR BLD AUTO: 21 % (ref 36–48)
HCT VFR BLD AUTO: 21.7 % (ref 36–48)
HCT VFR BLD AUTO: 23.6 % (ref 36–48)
HEMOCCULT STL QL: POSITIVE
HGB BLD-MCNC: 6.5 G/DL (ref 13–16)
HGB BLD-MCNC: 7.1 G/DL (ref 13–16)
HGB BLD-MCNC: 7.5 G/DL (ref 13–16)
LYMPHOCYTES # BLD: 2.1 K/UL (ref 0.9–3.6)
LYMPHOCYTES NFR BLD: 15 % (ref 21–52)
MAGNESIUM SERPL-MCNC: 2.6 MG/DL (ref 1.6–2.6)
MCH RBC QN AUTO: 27.3 PG (ref 24–34)
MCHC RBC AUTO-ENTMCNC: 31 G/DL (ref 31–37)
MCV RBC AUTO: 88.2 FL (ref 74–97)
MONOCYTES # BLD: 0.4 K/UL (ref 0.05–1.2)
MONOCYTES NFR BLD: 3 % (ref 3–10)
NEUTS SEG # BLD: 11.4 K/UL (ref 1.8–8)
NEUTS SEG NFR BLD: 82 % (ref 40–73)
PLATELET # BLD AUTO: 692 K/UL (ref 135–420)
PLATELET COMMENTS,PCOM: ABNORMAL
PMV BLD AUTO: 9.3 FL (ref 9.2–11.8)
POTASSIUM SERPL-SCNC: 4.8 MMOL/L (ref 3.5–5.5)
RBC # BLD AUTO: 2.38 M/UL (ref 4.7–5.5)
RBC MORPH BLD: ABNORMAL
SODIUM SERPL-SCNC: 139 MMOL/L (ref 136–145)
VIT B12 SERPL-MCNC: 203 PG/ML (ref 211–911)
WBC # BLD AUTO: 13.9 K/UL (ref 4.6–13.2)

## 2019-09-10 PROCEDURE — 97530 THERAPEUTIC ACTIVITIES: CPT

## 2019-09-10 PROCEDURE — 97165 OT EVAL LOW COMPLEX 30 MIN: CPT

## 2019-09-10 PROCEDURE — 74011000258 HC RX REV CODE- 258: Performed by: INTERNAL MEDICINE

## 2019-09-10 PROCEDURE — 74011250636 HC RX REV CODE- 250/636: Performed by: HOSPITALIST

## 2019-09-10 PROCEDURE — 74011250637 HC RX REV CODE- 250/637: Performed by: HOSPITALIST

## 2019-09-10 PROCEDURE — 74011636637 HC RX REV CODE- 636/637: Performed by: HOSPITALIST

## 2019-09-10 PROCEDURE — C9113 INJ PANTOPRAZOLE SODIUM, VIA: HCPCS | Performed by: HOSPITALIST

## 2019-09-10 PROCEDURE — 80048 BASIC METABOLIC PNL TOTAL CA: CPT

## 2019-09-10 PROCEDURE — 82607 VITAMIN B-12: CPT

## 2019-09-10 PROCEDURE — 36415 COLL VENOUS BLD VENIPUNCTURE: CPT

## 2019-09-10 PROCEDURE — 65660000000 HC RM CCU STEPDOWN

## 2019-09-10 PROCEDURE — 83735 ASSAY OF MAGNESIUM: CPT

## 2019-09-10 PROCEDURE — 82272 OCCULT BLD FECES 1-3 TESTS: CPT

## 2019-09-10 PROCEDURE — 97161 PT EVAL LOW COMPLEX 20 MIN: CPT

## 2019-09-10 PROCEDURE — 82550 ASSAY OF CK (CPK): CPT

## 2019-09-10 PROCEDURE — P9016 RBC LEUKOCYTES REDUCED: HCPCS

## 2019-09-10 PROCEDURE — 36430 TRANSFUSION BLD/BLD COMPNT: CPT

## 2019-09-10 PROCEDURE — 85025 COMPLETE CBC W/AUTO DIFF WBC: CPT

## 2019-09-10 PROCEDURE — 85018 HEMOGLOBIN: CPT

## 2019-09-10 PROCEDURE — 74011000250 HC RX REV CODE- 250: Performed by: HOSPITALIST

## 2019-09-10 PROCEDURE — 97535 SELF CARE MNGMENT TRAINING: CPT

## 2019-09-10 RX ORDER — SODIUM CHLORIDE 9 MG/ML
250 INJECTION, SOLUTION INTRAVENOUS AS NEEDED
Status: DISCONTINUED | OUTPATIENT
Start: 2019-09-10 | End: 2019-09-20 | Stop reason: HOSPADM

## 2019-09-10 RX ORDER — PANTOPRAZOLE SODIUM 40 MG/10ML
40 INJECTION, POWDER, LYOPHILIZED, FOR SOLUTION INTRAVENOUS EVERY 12 HOURS
Status: DISCONTINUED | OUTPATIENT
Start: 2019-09-10 | End: 2019-09-10 | Stop reason: RX

## 2019-09-10 RX ADMIN — OXYCODONE HYDROCHLORIDE AND ACETAMINOPHEN 2 TABLET: 5; 325 TABLET ORAL at 06:59

## 2019-09-10 RX ADMIN — OXYCODONE HYDROCHLORIDE AND ACETAMINOPHEN 2 TABLET: 5; 325 TABLET ORAL at 18:42

## 2019-09-10 RX ADMIN — PANTOPRAZOLE SODIUM 40 MG: 40 INJECTION, POWDER, FOR SOLUTION INTRAVENOUS at 21:24

## 2019-09-10 RX ADMIN — PREDNISONE 40 MG: 20 TABLET ORAL at 10:05

## 2019-09-10 RX ADMIN — SODIUM CHLORIDE, SODIUM ACETATE ANHYDROUS, SODIUM GLUCONATE, POTASSIUM CHLORIDE, AND MAGNESIUM CHLORIDE: 526; 222; 502; 37; 30 INJECTION, SOLUTION INTRAVENOUS at 14:06

## 2019-09-10 RX ADMIN — CEFEPIME 2 G: 2 INJECTION, POWDER, FOR SOLUTION INTRAVENOUS at 10:04

## 2019-09-10 RX ADMIN — PANTOPRAZOLE SODIUM 40 MG: 40 INJECTION, POWDER, FOR SOLUTION INTRAVENOUS at 10:04

## 2019-09-10 NOTE — PROGRESS NOTES
RENAL DAILY PROGRESS NOTE            61y M with PMH DM, DVT admittted for altered metnal status, seen for MICK  Subjective:     Complaint:   Overnight events noted  Yesterday he was started prednisone, admit his pain is better. His CK is improving. Good urine output in last 24hrs. IMPRESSION:   MICK, pre renal, was on diuretics and nsaids,  Anemia  Leucocytosis   DVT,   Gout, acute gout flair, started on prednisone 9/9  Right thigh swelling    PLAN:   Improving renal function with hydration, CK is trending down, continue IV hydration   Monitor Hb, would suggest one unit BT. Discussed with Dr. Codey Gaffney      Current Facility-Administered Medications   Medication Dose Route Frequency    0.9% sodium chloride infusion 250 mL  250 mL IntraVENous PRN    pantoprazole (PROTONIX) 40 mg in sodium chloride 0.9% 10 mL injection  40 mg IntraVENous Q12H    electrolyte-r (NORMOSOL R) infusion   IntraVENous CONTINUOUS    predniSONE (DELTASONE) tablet 40 mg  40 mg Oral DAILY WITH BREAKFAST    oxyCODONE-acetaminophen (PERCOCET) 5-325 mg per tablet 1-2 Tab  1-2 Tab Oral Q6H PRN    0.9% sodium chloride infusion 250 mL  250 mL IntraVENous PRN    cefepime (MAXIPIME) 2 g in sterile water (preservative free) 10 mL IV syringe  2 g IntraVENous Q12H    sodium chloride (NS) flush 5-10 mL  5-10 mL IntraVENous PRN    acetaminophen (TYLENOL) tablet 650 mg  650 mg Oral Q4H PRN    ondansetron (ZOFRAN) injection 4 mg  4 mg IntraVENous Q4H PRN       Review of Symptoms: comprehensive ROS negative except above.    Objective:     Patient Vitals for the past 24 hrs:   Temp Pulse Resp BP SpO2   09/10/19 0826 98.6 °F (37 °C) 74 16 116/70 100 %   09/10/19 0725 97 °F (36.1 °C) 72 18 133/86 100 %   09/10/19 0655 97 °F (36.1 °C) 71 18 124/80 99 %   09/10/19 0638 97 °F (36.1 °C) 68 18 (!) 132/8 99 %   09/10/19 0630 97.5 °F (36.4 °C) 66 18 117/75 99 %   09/10/19 0400 98 °F (36.7 °C) 76 20 136/65 100 %   09/10/19 0000 98 °F (36.7 °C) 80 16 140/70 98 %   09/09/19 2200 98 °F (36.7 °C) 74 18 132/69 98 %   09/09/19 2000 97 °F (36.1 °C) 71 18 130/77 95 %   09/09/19 1601 98 °F (36.7 °C) 80 18 111/59 95 %   09/09/19 1127 99.3 °F (37.4 °C) 76 20 108/58 96 %        Weight change:      09/08 1901 - 09/10 0700  In: 1811.7 [P.O.:1530;  I.V.:281.7]  Out: 3400 [Urine:3400]    Intake/Output Summary (Last 24 hours) at 9/10/2019 1028  Last data filed at 9/10/2019 0834  Gross per 24 hour   Intake 1031.67 ml   Output 2100 ml   Net -1068.33 ml     Physical Exam:   General: comfortable, no acute distress   HEENT sclera anicteric, supple neck, no thyromegaly  CVS: S1S2 heard,  no rub  RS: + air entry b/l,   Abd: Soft, Non tender,   Neuro: non focal, awake, alert , CN II-XII are grossly intact  Extrm: no edema, no cyanosis, clubbing   Skin: no visible  Rash  Musculoskeletal: No gross joints or bone deformities         Data Review:     LABS:   Hematology:   Recent Labs     09/10/19  0343 09/09/19  2242 09/09/19  0945 09/09/19  0010 09/08/19  1601 09/08/19  0345 09/07/19  1410   WBC 13.9*  --  13.1  --   --  16.1* 18.6*   HGB 6.5* 6.8* 7.2* 7.0* 7.1* 6.5* 8.1*   HCT 21.0* 21.2* 22.6* 21.7* 22.6* 20.2* 24.7*     Chemistry:   Recent Labs     09/10/19  0343 09/09/19  0945 09/08/19  0345 09/07/19  1410   BUN 43* 54* 61* 51*   CREA 1.50* 1.90* 2.45* 2.31*   CA 8.9 9.1 8.6 9.8   ALB  --   --   --  2.3*   K 4.8 4.2 5.1 5.3    139 142 137    108 107 100   CO2 22 24 23 24   * 112* 110* 150*            Procedures/imaging: see electronic medical records for all procedures, Xrays and details which were not copied into this note but were reviewed prior to creation of Plan          Assessment & Plan:     As above         Sophia Lee MD  9/10/2019  12:25 PM
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
## 2019-09-10 NOTE — PROGRESS NOTES
OT eval order received and chart reviewed. Pt currently has orders for x-ray of R hand and L knee. Will await results and f/u later as schedule allows.      Thank you for this referral,  Cris Madsen MS, OTR/L

## 2019-09-10 NOTE — PROGRESS NOTES
attempted to visit patient and was not able to do a spiritual assessment. Patient was not available.  Will follow up with patient  [id number] [facility] Department  [phone]

## 2019-09-10 NOTE — PROGRESS NOTES
Spoke with Inga Cristina [phone] at [facility] He may return to group home when he is medically cleared.  Zulma Barrios will be available 3pm-11pm ([phone])  Claudia Diallo RN BSN  Case Management [phone]

## 2019-09-10 NOTE — PROGRESS NOTES
Assisted patient with the execution of [employer] Directives. Placed the copy into patient's \"like paper chart. \"  See Flow Sheet for other pastoral interventions. Chaplains will continue to follow and provide pastoral care on an as needed/requested basis.     [address] [place] [address]  [phone]
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
## 2019-09-10 NOTE — PROGRESS NOTES
PT eval order received and chart reviewed including recent imaging studies. Unable to see patient at this time as he is getting cleaned up with nursing after BM. Will follow up as patient schedule allows.  Thank you for this referral. Regan Trveino, PT, DPT

## 2019-09-10 NOTE — ACP (ADVANCE CARE PLANNING)
Assisted patient with the execution of Advance Medical Directives. Placed the copy into patient's \"like paper chart. \"  See Flow Sheet for other pastoral interventions. Chaplains will continue to follow and provide pastoral care on an as needed/requested basis.     [address] [place] [address]  [phone]

## 2019-09-10 NOTE — PROGRESS NOTES
Shift Summary Note:  Assumed car of patient in bed awake and alert. No s/s of acute distress or discomfort. Medicated x 2 for right arm pain. No stool eliminated. H/H <7 now receiving I`1 unit of PRBC's No s/s of discomfort. Continue to monitor, call bell within reach.   Patient Vitals for the past 12 hrs:   Temp Pulse Resp BP SpO2   09/10/19 0725 97 °F (36.1 °C) 72 18 133/86 100 %   09/10/19 0655 97 °F (36.1 °C) 71 18 124/80 99 %   09/10/19 0638 97 °F (36.1 °C) 68 18 (!) 132/8 99 %   09/10/19 0630 97.5 °F (36.4 °C) 66 18 117/75 99 %   09/10/19 0400 98 °F (36.7 °C) 76 20 136/65 100 %   09/10/19 0000 98 °F (36.7 °C) 80 16 140/70 98 %   09/09/19 2200 98 °F (36.7 °C) 74 18 132/69 98 %   09/09/19 2000 97 °F (36.1 °C) 71 18 130/77 95 %

## 2019-09-10 NOTE — PROGRESS NOTES
PT eval order received and chart reviewed. Unable to see patient at this time as he is awaiting Left knee and Right Hand XR. Will follow up as patient schedule allows.  Thank you for this referral. Jorge Mercedes, PT, DPT
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
## 2019-09-11 ENCOUNTER — ANESTHESIA (OUTPATIENT)
Dept: ENDOSCOPY | Age: 64
DRG: 052 | End: 2019-09-11
Payer: MEDICAID

## 2019-09-11 ENCOUNTER — ANESTHESIA EVENT (OUTPATIENT)
Dept: ENDOSCOPY | Age: 64
DRG: 052 | End: 2019-09-11
Payer: MEDICAID

## 2019-09-11 LAB
ABO + RH BLD: NORMAL
ANION GAP SERPL CALC-SCNC: 6 MMOL/L (ref 3–18)
BASOPHILS # BLD: 0 K/UL (ref 0–0.06)
BASOPHILS NFR BLD: 0 % (ref 0–3)
BLD PROD TYP BPU: NORMAL
BLD PROD TYP BPU: NORMAL
BLOOD GROUP ANTIBODIES SERPL: NORMAL
BPU ID: NORMAL
BPU ID: NORMAL
BUN SERPL-MCNC: 35 MG/DL (ref 7–18)
BUN/CREAT SERPL: 25 (ref 12–20)
CALCIUM SERPL-MCNC: 8.8 MG/DL (ref 8.5–10.1)
CALLED TO:,BCALL1: NORMAL
CALLED TO:,BCALL2: NORMAL
CHLORIDE SERPL-SCNC: 106 MMOL/L (ref 100–111)
CK SERPL-CCNC: 65 U/L (ref 39–308)
CO2 SERPL-SCNC: 25 MMOL/L (ref 21–32)
CREAT SERPL-MCNC: 1.42 MG/DL (ref 0.6–1.3)
CROSSMATCH RESULT,%XM: NORMAL
CROSSMATCH RESULT,%XM: NORMAL
DIFFERENTIAL METHOD BLD: ABNORMAL
EOSINOPHIL # BLD: 0.2 K/UL (ref 0–0.4)
EOSINOPHIL NFR BLD: 1 % (ref 0–5)
ERYTHROCYTE [DISTWIDTH] IN BLOOD BY AUTOMATED COUNT: 16.1 % (ref 11.6–14.5)
GLUCOSE SERPL-MCNC: 99 MG/DL (ref 74–99)
HCT VFR BLD AUTO: 23.1 % (ref 36–48)
HCT VFR BLD AUTO: 23.8 % (ref 36–48)
HCT VFR BLD AUTO: 24 % (ref 36–48)
HGB BLD-MCNC: 7.3 G/DL (ref 13–16)
HGB BLD-MCNC: 7.4 G/DL (ref 13–16)
HGB BLD-MCNC: 7.5 G/DL (ref 13–16)
LYMPHOCYTES # BLD: 3 K/UL (ref 0.8–3.5)
LYMPHOCYTES NFR BLD: 18 % (ref 20–51)
MAGNESIUM SERPL-MCNC: 2.4 MG/DL (ref 1.6–2.6)
MCH RBC QN AUTO: 28 PG (ref 24–34)
MCHC RBC AUTO-ENTMCNC: 31.6 G/DL (ref 31–37)
MCV RBC AUTO: 88.5 FL (ref 74–97)
MONOCYTES # BLD: 1.2 K/UL (ref 0–1)
MONOCYTES NFR BLD: 7 % (ref 2–9)
MYELOCYTES NFR BLD MANUAL: 1 %
NEUTS SEG # BLD: 12.3 K/UL (ref 1.8–8)
NEUTS SEG NFR BLD: 73 % (ref 42–75)
NRBC BLD-RTO: 1 PER 100 WBC
PLATELET # BLD AUTO: 634 K/UL (ref 135–420)
PLATELET COMMENTS,PCOM: ABNORMAL
PMV BLD AUTO: 9.4 FL (ref 9.2–11.8)
POTASSIUM SERPL-SCNC: 4.6 MMOL/L (ref 3.5–5.5)
RBC # BLD AUTO: 2.61 M/UL (ref 4.7–5.5)
RBC MORPH BLD: ABNORMAL
RBC MORPH BLD: ABNORMAL
SODIUM SERPL-SCNC: 137 MMOL/L (ref 136–145)
SPECIMEN EXP DATE BLD: NORMAL
STATUS OF UNIT,%ST: NORMAL
STATUS OF UNIT,%ST: NORMAL
UNIT DIVISION, %UDIV: 0
UNIT DIVISION, %UDIV: 0
WBC # BLD AUTO: 16.8 K/UL (ref 4.6–13.2)

## 2019-09-11 PROCEDURE — 74011000250 HC RX REV CODE- 250: Performed by: HOSPITALIST

## 2019-09-11 PROCEDURE — 76040000019: Performed by: INTERNAL MEDICINE

## 2019-09-11 PROCEDURE — C9113 INJ PANTOPRAZOLE SODIUM, VIA: HCPCS | Performed by: HOSPITALIST

## 2019-09-11 PROCEDURE — 77030018846 HC SOL IRR STRL H20 ICUM -A: Performed by: INTERNAL MEDICINE

## 2019-09-11 PROCEDURE — 85018 HEMOGLOBIN: CPT

## 2019-09-11 PROCEDURE — 74011250637 HC RX REV CODE- 250/637: Performed by: HOSPITALIST

## 2019-09-11 PROCEDURE — 74011000250 HC RX REV CODE- 250

## 2019-09-11 PROCEDURE — 74011250636 HC RX REV CODE- 250/636

## 2019-09-11 PROCEDURE — 83735 ASSAY OF MAGNESIUM: CPT

## 2019-09-11 PROCEDURE — 80048 BASIC METABOLIC PNL TOTAL CA: CPT

## 2019-09-11 PROCEDURE — 74011000258 HC RX REV CODE- 258: Performed by: INTERNAL MEDICINE

## 2019-09-11 PROCEDURE — 76060000031 HC ANESTHESIA FIRST 0.5 HR: Performed by: INTERNAL MEDICINE

## 2019-09-11 PROCEDURE — 77030008565 HC TBNG SUC IRR ERBE -B: Performed by: INTERNAL MEDICINE

## 2019-09-11 PROCEDURE — 74011636637 HC RX REV CODE- 636/637: Performed by: HOSPITALIST

## 2019-09-11 PROCEDURE — 0DJ08ZZ INSPECTION OF UPPER INTESTINAL TRACT, VIA NATURAL OR ARTIFICIAL OPENING ENDOSCOPIC: ICD-10-PCS | Performed by: INTERNAL MEDICINE

## 2019-09-11 PROCEDURE — 82550 ASSAY OF CK (CPK): CPT

## 2019-09-11 PROCEDURE — 65660000000 HC RM CCU STEPDOWN

## 2019-09-11 PROCEDURE — 36415 COLL VENOUS BLD VENIPUNCTURE: CPT

## 2019-09-11 PROCEDURE — 74011250636 HC RX REV CODE- 250/636: Performed by: HOSPITALIST

## 2019-09-11 PROCEDURE — 85025 COMPLETE CBC W/AUTO DIFF WBC: CPT

## 2019-09-11 PROCEDURE — 74011000258 HC RX REV CODE- 258

## 2019-09-11 RX ORDER — SODIUM CHLORIDE 0.9 % (FLUSH) 0.9 %
5-40 SYRINGE (ML) INJECTION AS NEEDED
Status: DISCONTINUED | OUTPATIENT
Start: 2019-09-11 | End: 2019-09-11 | Stop reason: HOSPADM

## 2019-09-11 RX ORDER — LIDOCAINE HYDROCHLORIDE 20 MG/ML
INJECTION, SOLUTION EPIDURAL; INFILTRATION; INTRACAUDAL; PERINEURAL AS NEEDED
Status: DISCONTINUED | OUTPATIENT
Start: 2019-09-11 | End: 2019-09-11 | Stop reason: HOSPADM

## 2019-09-11 RX ORDER — INSULIN LISPRO 100 [IU]/ML
INJECTION, SOLUTION INTRAVENOUS; SUBCUTANEOUS ONCE
Status: DISCONTINUED | OUTPATIENT
Start: 2019-09-11 | End: 2019-09-11 | Stop reason: HOSPADM

## 2019-09-11 RX ORDER — SODIUM CHLORIDE 0.9 % (FLUSH) 0.9 %
5-40 SYRINGE (ML) INJECTION EVERY 8 HOURS
Status: DISCONTINUED | OUTPATIENT
Start: 2019-09-11 | End: 2019-09-11 | Stop reason: HOSPADM

## 2019-09-11 RX ORDER — PREDNISONE 20 MG/1
20 TABLET ORAL
Status: DISCONTINUED | OUTPATIENT
Start: 2019-09-11 | End: 2019-09-15

## 2019-09-11 RX ORDER — DEXTROSE 50 % IN WATER (D50W) INTRAVENOUS SYRINGE
25-50 AS NEEDED
Status: DISCONTINUED | OUTPATIENT
Start: 2019-09-11 | End: 2019-09-11 | Stop reason: HOSPADM

## 2019-09-11 RX ORDER — PROPOFOL 10 MG/ML
INJECTION, EMULSION INTRAVENOUS AS NEEDED
Status: DISCONTINUED | OUTPATIENT
Start: 2019-09-11 | End: 2019-09-11 | Stop reason: HOSPADM

## 2019-09-11 RX ORDER — MAGNESIUM SULFATE 100 %
4 CRYSTALS MISCELLANEOUS AS NEEDED
Status: DISCONTINUED | OUTPATIENT
Start: 2019-09-11 | End: 2019-09-11 | Stop reason: HOSPADM

## 2019-09-11 RX ORDER — SODIUM CHLORIDE, SODIUM LACTATE, POTASSIUM CHLORIDE, CALCIUM CHLORIDE 600; 310; 30; 20 MG/100ML; MG/100ML; MG/100ML; MG/100ML
50 INJECTION, SOLUTION INTRAVENOUS CONTINUOUS
Status: DISCONTINUED | OUTPATIENT
Start: 2019-09-11 | End: 2019-09-11 | Stop reason: HOSPADM

## 2019-09-11 RX ADMIN — PREDNISONE 20 MG: 20 TABLET ORAL at 09:50

## 2019-09-11 RX ADMIN — OXYCODONE HYDROCHLORIDE AND ACETAMINOPHEN 2 TABLET: 5; 325 TABLET ORAL at 04:23

## 2019-09-11 RX ADMIN — OXYCODONE HYDROCHLORIDE AND ACETAMINOPHEN 2 TABLET: 5; 325 TABLET ORAL at 10:03

## 2019-09-11 RX ADMIN — PANTOPRAZOLE SODIUM 40 MG: 40 INJECTION, POWDER, FOR SOLUTION INTRAVENOUS at 09:52

## 2019-09-11 RX ADMIN — PROPOFOL 50 MG: 10 INJECTION, EMULSION INTRAVENOUS at 13:36

## 2019-09-11 RX ADMIN — PANTOPRAZOLE SODIUM 40 MG: 40 INJECTION, POWDER, FOR SOLUTION INTRAVENOUS at 20:16

## 2019-09-11 RX ADMIN — SODIUM CHLORIDE, SODIUM ACETATE ANHYDROUS, SODIUM GLUCONATE, POTASSIUM CHLORIDE, AND MAGNESIUM CHLORIDE: 526; 222; 502; 37; 30 INJECTION, SOLUTION INTRAVENOUS at 10:03

## 2019-09-11 RX ADMIN — OXYCODONE HYDROCHLORIDE AND ACETAMINOPHEN 2 TABLET: 5; 325 TABLET ORAL at 19:13

## 2019-09-11 RX ADMIN — LIDOCAINE HYDROCHLORIDE 60 MG: 20 INJECTION, SOLUTION EPIDURAL; INFILTRATION; INTRACAUDAL; PERINEURAL at 13:33

## 2019-09-11 RX ADMIN — SODIUM CHLORIDE, SODIUM ACETATE ANHYDROUS, SODIUM GLUCONATE, POTASSIUM CHLORIDE, AND MAGNESIUM CHLORIDE: 526; 222; 502; 37; 30 INJECTION, SOLUTION INTRAVENOUS at 20:21

## 2019-09-11 RX ADMIN — PROPOFOL 100 MG: 10 INJECTION, EMULSION INTRAVENOUS at 13:33

## 2019-09-11 NOTE — PROGRESS NOTES
Pt not seen this morning  For EGD with GI today  Pending their recommendations whether to continue with NOAC vs consider filter  Following
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
## 2019-09-11 NOTE — PROGRESS NOTES
TRANSFER - IN REPORT:    Verbal report received from [address] RN(name) on Lakshmi [place] Korean  being received from [address] [place] [address](unit) for ordered procedure      Report consisted of patients Situation, Background, Assessment and   Recommendations(SBAR). Information from the following report(s) SBAR, Kardex, [facility] and Recent Results was reviewed with the receiving nurse. Opportunity for questions and clarification was provided. Assessment completed upon patients arrival to unit and care assumed.

## 2019-09-11 NOTE — PROGRESS NOTES
Pt feels good, still has pain in R hand but improving   R hand swelling improving, ROM improving   H/H stable   EGD neg.   R thigh size no change   Taper steroids  Leukocytosis due to steroids, no fevers, will monitor off Abx  Monitor H/H  Anticoagulation when ok with GI   Cont PT/OT  Full note to follow

## 2019-09-11 NOTE — ANESTHESIA POSTPROCEDURE EVALUATION
Procedure(s):  ENDOSCOPY. MAC    Anesthesia Post Evaluation      Multimodal analgesia: multimodal analgesia used between 6 hours prior to anesthesia start to PACU discharge  Patient location during evaluation: bedside  Patient participation: complete - patient participated  Level of consciousness: awake  Pain management: adequate  Airway patency: patent  Anesthetic complications: no  Cardiovascular status: stable  Respiratory status: acceptable  Hydration status: acceptable  Post anesthesia nausea and vomiting:  controlled      Vitals Value Taken Time   /58 9/11/2019  2:04 PM   Temp 36.8 °C (98.2 °F) 9/11/2019  2:04 PM   Pulse 70 9/11/2019  2:06 PM   Resp 16 9/11/2019  2:06 PM   SpO2 98 % 9/11/2019  2:06 PM   Vitals shown include unvalidated device data.

## 2019-09-11 NOTE — PROGRESS NOTES
Problem: Falls - Risk of  Goal: *Absence of Falls  Description  Document Caron Toure Fall Risk and appropriate interventions in the flowsheet. Outcome: Progressing Towards Goal  Note:   Fall Risk Interventions:  Mobility Interventions: PT Consult for mobility concerns, OT consult for ADLs         Medication Interventions: Bed/chair exit alarm    Elimination Interventions: Bed/chair exit alarm, Call light in reach, Urinal in reach              Problem: Pressure Injury - Risk of  Goal: *Prevention of pressure injury  Description  Document Lukasz Scale and appropriate interventions in the flowsheet. Outcome: Progressing Towards Goal  Note:   Pressure Injury Interventions:             Activity Interventions: Pressure redistribution bed/mattress(bed type)    Mobility Interventions: Pressure redistribution bed/mattress (bed type)    Nutrition Interventions: Document food/fluid/supplement intake    Friction and Shear Interventions: HOB 30 degrees or less                Problem: Patient Education: Go to Patient Education Activity  Goal: Patient/Family Education  Outcome: Progressing Towards Goal

## 2019-09-11 NOTE — PERIOP NOTES
TRANSFER - OUT REPORT:    Verbal report given to Janelle Mata RN(name) on Wanda Rodriguez  being transferred to (unit) for routine post - op       Report consisted of patients Situation, Background, Assessment and   Recommendations(SBAR). Information from the following report(s) SBAR, Kardex, Procedure Summary, Intake/Output, MAR and Recent Results was reviewed with the receiving nurse. Lines:   Peripheral IV 93/11/79 Right Cephalic (Active)   Site Assessment Clean, dry, & intact 9/11/2019  1:44 PM   Phlebitis Assessment 0 9/11/2019  1:44 PM   Infiltration Assessment 0 9/11/2019  1:44 PM   Dressing Status Clean, dry, & intact 9/11/2019  1:44 PM   Dressing Type Tape;Transparent 9/11/2019  1:44 PM   Hub Color/Line Status Pink; Infusing 9/11/2019  1:44 PM   Action Taken Open ports on tubing capped 9/10/2019  8:19 PM   Alcohol Cap Used Yes 9/10/2019  8:19 PM        Opportunity for questions and clarification was provided.       Patient transported with:   Registered Nurse  Tech

## 2019-09-11 NOTE — PROGRESS NOTES
1005 - Pt has nursing and phlebotomy in room at this time. Will follow up later in day. 1124 - Pt off floor. Will follow up per pt's schedule.

## 2019-09-11 NOTE — ROUTINE PROCESS
TRANSFER - OUT REPORT:    Verbal reporto  given to [employer] (name) on Gil Ortega  being transferred OR (unit) for ordered procedure       Report consisted of patients Situation, Background, Assessment and   Recommendations(SBAR). Information from the following report(s) SBAR, Kardex, Intake/Output, MAR and Recent Results was reviewed with the receiving nurse. Lines:   Peripheral IV 97/97/92 Right Cephalic (Active)   Site Assessment Clean, dry, & intact 9/10/2019  8:19 PM   Phlebitis Assessment 0 9/10/2019  8:19 PM   Infiltration Assessment 0 9/10/2019  8:19 PM   Dressing Status Clean, dry, & intact 9/10/2019  8:19 PM   Dressing Type Tape;Transparent 9/10/2019  8:19 PM   Hub Color/Line Status Pink; Infusing 9/10/2019  8:19 PM   Action Taken Open ports on tubing capped 9/10/2019  8:19 PM   Alcohol Cap Used Yes 9/10/2019  8:19 PM        Opportunity for questions and clarification was provided.       Patient transported with:   Medical Chart

## 2019-09-11 NOTE — PROGRESS NOTES
Occupational Therapy Note    Patient: Sharon Fuller (36 y.o. male)  Date: 9/11/2019  Diagnosis: Sepsis ([facility] [A41.9] <principal problem not specified>  Procedure(s) (LRB):  ENDOSCOPY (N/A) Day of Surgery  Precautions: Fall  Chart, occupational therapy assessment, plan of care, and goals were reviewed. Occupational Therapy treatment attempted. Patient is unable to participate due to:  []  Nausea/vomiting  []  Eating  []  Pain  []  Pt lethargic  [x]  Off Unit  [x] Other:  Attempt x1 pt's nurse assessing pt (1005)  Attempt x2 pt is off unit for scheduled procedure (11:24)  Will f/u later as schedule allows. Thank you.     JONATHAN Hope/MAHOGANY

## 2019-09-11 NOTE — ROUTINE PROCESS
Bedside and Verbal shift change report given to Aleksey Pardo RN (oncoming nurse) by Sharif Lawrence RN (offgoing nurse). Report included the following information SBAR, Kardex, MAR and Recent Results.

## 2019-09-11 NOTE — ANESTHESIA PREPROCEDURE EVALUATION
Relevant Problems   No relevant active problems       Anesthetic History   No history of anesthetic complications            Review of Systems / Medical History  Patient summary reviewed and pertinent labs reviewed    Pulmonary                   Neuro/Psych              Cardiovascular                       GI/Hepatic/Renal                Endo/Other        Anemia     Other Findings              Physical Exam    Airway  Mallampati: III  TM Distance: 4 - 6 cm  Neck ROM: normal range of motion        Cardiovascular    Rhythm: regular  Rate: normal         Dental    Dentition: Poor dentition     Pulmonary  Breath sounds clear to auscultation               Abdominal  GI exam deferred       Other Findings            Anesthetic Plan    ASA: 2  Anesthesia type: MAC            Anesthetic plan and risks discussed with: Patient

## 2019-09-12 LAB
ANION GAP SERPL CALC-SCNC: 6 MMOL/L (ref 3–18)
BASOPHILS # BLD: 0 K/UL (ref 0–0.06)
BASOPHILS NFR BLD: 0 % (ref 0–3)
BUN SERPL-MCNC: 30 MG/DL (ref 7–18)
BUN/CREAT SERPL: 26 (ref 12–20)
CALCIUM SERPL-MCNC: 9 MG/DL (ref 8.5–10.1)
CHLORIDE SERPL-SCNC: 104 MMOL/L (ref 100–111)
CK SERPL-CCNC: 47 U/L (ref 39–308)
CO2 SERPL-SCNC: 26 MMOL/L (ref 21–32)
CREAT SERPL-MCNC: 1.17 MG/DL (ref 0.6–1.3)
DIFFERENTIAL METHOD BLD: ABNORMAL
EOSINOPHIL # BLD: 0.3 K/UL (ref 0–0.4)
EOSINOPHIL NFR BLD: 2 % (ref 0–5)
ERYTHROCYTE [DISTWIDTH] IN BLOOD BY AUTOMATED COUNT: 16.1 % (ref 11.6–14.5)
GLUCOSE SERPL-MCNC: 94 MG/DL (ref 74–99)
HCT VFR BLD AUTO: 24.1 % (ref 36–48)
HCT VFR BLD AUTO: 25.5 % (ref 36–48)
HCT VFR BLD AUTO: 26.8 % (ref 36–48)
HGB BLD-MCNC: 7.6 G/DL (ref 13–16)
HGB BLD-MCNC: 8 G/DL (ref 13–16)
HGB BLD-MCNC: 8.6 G/DL (ref 13–16)
LYMPHOCYTES # BLD: 1.7 K/UL (ref 0.8–3.5)
LYMPHOCYTES NFR BLD: 11 % (ref 20–51)
MAGNESIUM SERPL-MCNC: 2.1 MG/DL (ref 1.6–2.6)
MCH RBC QN AUTO: 27.9 PG (ref 24–34)
MCHC RBC AUTO-ENTMCNC: 31.4 G/DL (ref 31–37)
MCV RBC AUTO: 88.9 FL (ref 74–97)
METAMYELOCYTES NFR BLD MANUAL: 2 %
MONOCYTES # BLD: 1.5 K/UL (ref 0–1)
MONOCYTES NFR BLD: 10 % (ref 2–9)
MYELOCYTES NFR BLD MANUAL: 1 %
NEUTS BAND NFR BLD MANUAL: 1 % (ref 0–5)
NEUTS SEG # BLD: 11.2 K/UL (ref 1.8–8)
NEUTS SEG NFR BLD: 73 % (ref 42–75)
PLATELET # BLD AUTO: 592 K/UL (ref 135–420)
PLATELET COMMENTS,PCOM: ABNORMAL
PMV BLD AUTO: 9.5 FL (ref 9.2–11.8)
POTASSIUM SERPL-SCNC: 4.8 MMOL/L (ref 3.5–5.5)
RBC # BLD AUTO: 2.87 M/UL (ref 4.7–5.5)
RBC MORPH BLD: ABNORMAL
SODIUM SERPL-SCNC: 136 MMOL/L (ref 136–145)
WBC # BLD AUTO: 15.2 K/UL (ref 4.6–13.2)

## 2019-09-12 PROCEDURE — 36415 COLL VENOUS BLD VENIPUNCTURE: CPT

## 2019-09-12 PROCEDURE — 97530 THERAPEUTIC ACTIVITIES: CPT

## 2019-09-12 PROCEDURE — 74011000250 HC RX REV CODE- 250: Performed by: HOSPITALIST

## 2019-09-12 PROCEDURE — 74011250637 HC RX REV CODE- 250/637: Performed by: FAMILY MEDICINE

## 2019-09-12 PROCEDURE — 85025 COMPLETE CBC W/AUTO DIFF WBC: CPT

## 2019-09-12 PROCEDURE — 82550 ASSAY OF CK (CPK): CPT

## 2019-09-12 PROCEDURE — 97535 SELF CARE MNGMENT TRAINING: CPT

## 2019-09-12 PROCEDURE — 74011636637 HC RX REV CODE- 636/637: Performed by: HOSPITALIST

## 2019-09-12 PROCEDURE — C9113 INJ PANTOPRAZOLE SODIUM, VIA: HCPCS | Performed by: HOSPITALIST

## 2019-09-12 PROCEDURE — 74011250636 HC RX REV CODE- 250/636: Performed by: HOSPITALIST

## 2019-09-12 PROCEDURE — 83735 ASSAY OF MAGNESIUM: CPT

## 2019-09-12 PROCEDURE — 65660000000 HC RM CCU STEPDOWN

## 2019-09-12 PROCEDURE — 85018 HEMOGLOBIN: CPT

## 2019-09-12 PROCEDURE — 74011250637 HC RX REV CODE- 250/637: Performed by: NURSE PRACTITIONER

## 2019-09-12 PROCEDURE — 97110 THERAPEUTIC EXERCISES: CPT

## 2019-09-12 PROCEDURE — 80048 BASIC METABOLIC PNL TOTAL CA: CPT

## 2019-09-12 PROCEDURE — 74011250637 HC RX REV CODE- 250/637: Performed by: HOSPITALIST

## 2019-09-12 RX ORDER — PANTOPRAZOLE SODIUM 40 MG/1
40 TABLET, DELAYED RELEASE ORAL
Status: DISCONTINUED | OUTPATIENT
Start: 2019-09-13 | End: 2019-09-20 | Stop reason: HOSPADM

## 2019-09-12 RX ADMIN — OXYCODONE HYDROCHLORIDE AND ACETAMINOPHEN 2 TABLET: 5; 325 TABLET ORAL at 21:04

## 2019-09-12 RX ADMIN — ACETAMINOPHEN 650 MG: 325 TABLET ORAL at 05:58

## 2019-09-12 RX ADMIN — PREDNISONE 20 MG: 20 TABLET ORAL at 09:46

## 2019-09-12 RX ADMIN — OXYCODONE HYDROCHLORIDE AND ACETAMINOPHEN 2 TABLET: 5; 325 TABLET ORAL at 01:16

## 2019-09-12 RX ADMIN — APIXABAN 5 MG: 5 TABLET, FILM COATED ORAL at 18:15

## 2019-09-12 RX ADMIN — PANTOPRAZOLE SODIUM 40 MG: 40 INJECTION, POWDER, FOR SOLUTION INTRAVENOUS at 09:46

## 2019-09-12 RX ADMIN — OXYCODONE HYDROCHLORIDE AND ACETAMINOPHEN 2 TABLET: 5; 325 TABLET ORAL at 10:01

## 2019-09-12 RX ADMIN — APIXABAN 5 MG: 5 TABLET, FILM COATED ORAL at 15:29

## 2019-09-12 NOTE — ROUTINE PROCESS
Bedside and Verbal shift change report given to [address] [place] [address] (oncoming nurse) by Dave Lynn RN (offgoing nurse). Report included the following information SBAR, Kardex, Intake/Output, MAR and Recent Results.
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
## 2019-09-12 NOTE — PROGRESS NOTES
Problem: Falls - Risk of  Goal: *Absence of Falls  Description  Document Ct Boss Fall Risk and appropriate interventions in the flowsheet.   Outcome: Progressing Towards Goal  Note:   Fall Risk Interventions:  Mobility Interventions: Bed/chair exit alarm         Medication Interventions: Assess postural VS orthostatic hypotension, Bed/chair exit alarm    Elimination Interventions: Bed/chair exit alarm, Call light in reach, Patient to call for help with toileting needs              Problem: Anemia Care Plan (Adult and Pediatric)  Goal: *Labs within defined limits  Outcome: Progressing Towards Goal     Problem: Patient Education: Go to Patient Education Activity  Goal: Patient/Family Education  Outcome: Progressing Towards Goal

## 2019-09-12 NOTE — PROGRESS NOTES
Problem: Falls - Risk of  Goal: *Absence of Falls  Description  Document Margrett Bernheim Fall Risk and appropriate interventions in the flowsheet. Outcome: Progressing Towards Goal  Note:   Fall Risk Interventions:  Mobility Interventions: Bed/chair exit alarm         Medication Interventions: Assess postural VS orthostatic hypotension    Elimination Interventions: Bed/chair exit alarm, Call light in reach              Problem: Pressure Injury - Risk of  Goal: *Prevention of pressure injury  Description  Document Lukasz Scale and appropriate interventions in the flowsheet.   Outcome: Progressing Towards Goal  Note:   Pressure Injury Interventions:       Moisture Interventions: Absorbent underpads    Activity Interventions: Pressure redistribution bed/mattress(bed type)    Mobility Interventions: HOB 30 degrees or less    Nutrition Interventions: Document food/fluid/supplement intake    Friction and Shear Interventions: Apply protective barrier, creams and emollients                Problem: Anemia Care Plan (Adult and Pediatric)  Goal: *Labs within defined limits  Outcome: Progressing Towards Goal

## 2019-09-12 NOTE — PROGRESS NOTES
RENAL DAILY PROGRESS NOTE            61y M with PMH DM, DVT admittted for altered metnal status, seen for MICK  Subjective:     Complaint:   Overnight events noted        IMPRESSION:   MICK, pre renal, was on diuretics and nsaids,  Anemia  Leucocytosis   DVT,   Gout, acute gout flair, started on prednisone 9/9  Right thigh swelling    PLAN:   Renal function now close to baseline, DC iv fluid. Continue to taper oral steroid. Will be available if any question or concern. Monitor anemia. Discussed with Dr. Abdulaziz Goss      Current Facility-Administered Medications   Medication Dose Route Frequency    [START ON 9/13/2019] pantoprazole (PROTONIX) tablet 40 mg  40 mg Oral ACB    apixaban (ELIQUIS) tablet 5 mg  5 mg Oral BID    predniSONE (DELTASONE) tablet 20 mg  20 mg Oral DAILY WITH BREAKFAST    0.9% sodium chloride infusion 250 mL  250 mL IntraVENous PRN    oxyCODONE-acetaminophen (PERCOCET) 5-325 mg per tablet 1-2 Tab  1-2 Tab Oral Q6H PRN    0.9% sodium chloride infusion 250 mL  250 mL IntraVENous PRN    sodium chloride (NS) flush 5-10 mL  5-10 mL IntraVENous PRN    acetaminophen (TYLENOL) tablet 650 mg  650 mg Oral Q4H PRN    ondansetron (ZOFRAN) injection 4 mg  4 mg IntraVENous Q4H PRN       Review of Symptoms: comprehensive ROS negative except above.    Objective:     Patient Vitals for the past 24 hrs:   Temp Pulse Resp BP SpO2   09/12/19 1106 97.8 °F (36.6 °C) 88 20 129/82 96 %   09/12/19 0730 98.6 °F (37 °C) 77 17 146/90 96 %   09/12/19 0400 98.2 °F (36.8 °C) 75 18 133/83 100 %   09/12/19 0000 97.4 °F (36.3 °C) 69 18 128/83 100 %   09/11/19 2011 97.6 °F (36.4 °C) 74 18 122/69 96 %   09/11/19 1451 98.2 °F (36.8 °C) 69 16 145/75 100 %   09/11/19 1404 98.2 °F (36.8 °C) 69 12 128/58 99 %   09/11/19 1354  76 18 132/90 100 %   09/11/19 1346 97.6 °F (36.4 °C) 78 (!) 122 128/85 97 %   09/11/19 1344 97.6 °F (36.4 °C) 81 16 128/85 99 %        Weight change:      09/10 1901 - 09/12 0700  In: 1006.7 [P.O.:690;  I.V.:316.7]  Out: 1975 [Urine:1975]    Intake/Output Summary (Last 24 hours) at 9/12/2019 1214  Last data filed at 9/12/2019 1111  Gross per 24 hour   Intake 1726.67 ml   Output 1525 ml   Net 201.67 ml     Physical Exam:   General: comfortable, no acute distress   HEENT sclera anicteric, supple neck, no thyromegaly  CVS: S1S2 heard,  no rub  RS: + air entry b/l,   Abd: Soft, Non tender,   Neuro: non focal, awake, alert , CN II-XII are grossly intact  Extrm: no edema, no cyanosis, clubbing   Skin: no visible  Rash  Musculoskeletal: No gross joints or bone deformities         Data Review:     LABS:   Hematology:   Recent Labs     09/12/19  1051 09/12/19  0542 09/11/19  2325 09/11/19  1034 09/11/19  0511 09/10/19  2306 09/10/19  1248 09/10/19  0343 09/09/19  2242   WBC  --  15.2*  --   --  16.8*  --   --  13.9*  --    HGB 8.6* 8.0* 7.5* 7.4* 7.3* 7.1* 7.5* 6.5* 6.8*   HCT 26.8* 25.5* 24.0* 23.8* 23.1* 21.7* 23.6* 21.0* 21.2*     Chemistry:   Recent Labs     09/12/19  0542 09/11/19  0511 09/10/19  0343   BUN 30* 35* 43*   CREA 1.17 1.42* 1.50*   CA 9.0 8.8 8.9   K 4.8 4.6 4.8    137 139    106 109   CO2 26 25 22   GLU 94 99 136*            Procedures/imaging: see electronic medical records for all procedures, Xrays and details which were not copied into this note but were reviewed prior to creation of Plan          Assessment & Plan:     As above         Ryley Thomas MD  9/12/2019  12:25 PM

## 2019-09-12 NOTE — ROUTINE PROCESS
Bedside and Verbal shift change report given to Shala RN (oncoming nurse) by Kate Owusu RN (offgoing nurse). Report included the following information SBAR, Kardex, Intake/Output, MAR and Recent Results.

## 2019-09-12 NOTE — PROGRESS NOTES
Met with patient about discharge planning. Patient does not wish to return to [facility]. Patient states he will work on getting housing with friends for [facility] to be able to provide therapy. He then stated he would begin working again once he was strong and he would be able to afford housing.  Patient asked that we return tomorrow and he would hopefully have an address for us.'  Yves Horn RN BSN  Case Management [phone]

## 2019-09-12 NOTE — PROGRESS NOTES
Interdisciplinary Round Note   Patient Information:   Esteban Grimes   208/61   Reason for Admission: Sepsis [facility]) [A41.9]   Attending Provider:   Odalys Walker  Primary Care Physician:       None       None   Estimated discharge date:  ***   Hospital day: 5  [unfilled]  3d 7h  RRAT Score: Low Risk            7       Total Score        3 Patient Length of Stay (>5 days = 3)    4 Pt. Coverage (Medicare=5 , Medicaid, or Self-Pay=4)        Criteria that do not apply:    Has Seen PCP in Last 6 Months (Yes=3, No=0)    . Living with Significant Other. Assisted Living. LTAC. SNF. or   Rehab    IP Visits Last 12 Months (1-3=4, 4=9, >4=11)    Charlson Comorbidity Score (Age + Comorbid Conditions)       {Telemetry:60459583}     {RESTRAINT HISTORY:21287}  {RESTRAINT REASON:220010}  {RESTRAINT TYPES:220009}     {VTE Prophylaxis:97415}      Lines, Drains, & Airways  {CATHETERS/LINES (PLUS INDICATIONS):02197321}       IV Antibiotics:    Current Antimicrobial Therapy (168h ago, onward)    None        GI Prophylaxis: GI Prophylaxis: no   Type: ***      Recent Glucose Results:   Lab Results   Component Value Date/Time    GLU 94 09/12/2019 05:42 AM      Activity Level:   Activity Level: Bed Rest    Needs assistance with ADLs: no       Goals for Today: ***   Recommendations:   Discharge Disposition: {PT D/C Recommendations:99552}  {Consults needed:37157}  {READMISSION RISK INTERVENTIONS:31217}    Needs for Discharge: *** IDR Team: ***  Recommendations from IDR team: ***    Other Notes: ***

## 2019-09-12 NOTE — ROUTINE PROCESS
Bedside and Verbal shift change report given to Katja Jensen RN and Randi García RN (oncoming nurse) by Roger Manuel RN (offgoing nurse). Report included the following information SBAR, Kardex, MAR and Recent Results.

## 2019-09-12 NOTE — PROGRESS NOTES
Problem: Mobility Impaired (Adult and Pediatric)  Goal: *Acute Goals and Plan of Care (Insert Text)  Description  Physical Therapy Goals  Initiated 9/10/2019 and to be accomplished within 7 day(s)  1. Patient will move from supine to sit and sit to supine, scoot up and down and roll side to side in bed with moderate assistance . 2.  Patient will transfer from bed to chair and chair to bed with moderate assistance using the least restrictive device OR sliding board. 3.  Patient will perform sit to stand with moderate assistance. 4.  Patient will ambulate with moderate assistance for 5 feet with the least restrictive device. PLOF: Patient reports he was able to walk and was working and has no AD/DME at home. Patient was living with his nephew in a 2nd floor apartment. Outcome: Progressing Towards Goal   PHYSICAL THERAPY TREATMENT    Patient: Jeannine Flowers (33 y.o. male)  Date: 9/12/2019  Diagnosis: Sepsis ([facility] [A41.9] <principal problem not specified>  Procedure(s) (LRB):  ENDOSCOPY (N/A) 1 Day Post-Op  Precautions: Fall      ASSESSMENT:  Pt found supine HOB elevated willing to participate w/ therapy. Tx performed w/ OT to maximize safety of pt and staff as well as to facilitate balance and stability of pt all while maintaining support and mitigating pain of (B) LEs. Pt cont to present w/ (B) LE swelling as well as right UE swelling. This greatly limits pt's mobility as little knee flexion and UE movement results in great pain. Pt req total assistance w/ (B) LEs to EOB as pt unable to perform self controlled movements when doffing LES to EOB. When attempting when right LE went off EOB, pt experienced extreme right knee pain requiring therapist to bring back up to bed. Once pain controled w/ positioning, pt returned to EOB w/ complete support and slowly lowered legs to floor while seated balance was facilitated.  Once safe position obtained w/ (B) LEs on floor, pt was able to maintain good balance w/ little complaints of pain. Attempted seated heel slides w/ assistance, tolerated little. Standing unsafe this visit as pt unable to obtain proper LE alignment. Once provided pericare and replaced sheets, pt returned to supine max A X2 total A w/ support of LEs and increased pain. Pt rolled to left for further pericare and linen change req max A X2 and returned to supine w/ (B) LEs supported. Pt provided w/ all needs in reach. Nursing notified. Progression toward goals: good   ? Improving appropriately and progressing toward goals  ? Improving slowly and progressing toward goals  ? Not making progress toward goals and plan of care will be adjusted     PLAN:  Patient continues to benefit from skilled intervention to address the above impairments. Continue treatment per established plan of care. Discharge Recommendations:  Levar Smith  Further Equipment Recommendations for Discharge:   and [facility] per progression      SUBJECTIVE:   Patient stated Did I do good?     OBJECTIVE DATA SUMMARY:   Critical Behavior:  Neurologic State: Alert  Orientation Level: Oriented X4  Cognition: Follows commands  Safety/Judgement: Fall prevention  Functional Mobility Training:  Bed Mobility:  Rolling: Maximum assistance  Supine to Sit: Maximum assistance;Assist x2  Sit to Supine: Maximum assistance;Assist x2  Scooting: Total assistance  Balance:  Sitting: Impaired; With support  Sitting - Static: Good (unsupported)  Sitting - Dynamic: Fair (occasional)  Standing: (not tested )  Therapeutic Exercises:       EXERCISE   Sets   Reps   Active Active Assist   Passive Self ROM   Comments   Ankle Pumps 1 10  ? ? ? ?    Quad Sets/Glut Sets 1 10  ? ? ? ? Hold for 5 secs   Heel slides  1 10 ? ? ? ? Did not voice pain level    Activity Tolerance:   Good   Please refer to the flowsheet for vital signs taken during this treatment. After treatment:   ? Patient left in no apparent distress sitting up in chair  ? Patient left in no apparent distress in bed  ? Call bell left within reach  ? Nursing notified  ? Caregiver present  ? Bed alarm activated  ? SCDs applied      COMMUNICATION/EDUCATION:   ?         Role of Physical Therapy in the acute care setting. ?         Fall prevention education was provided and the patient/caregiver indicated understanding. ? Patient/family have participated as able in working toward goals and plan of care. ?         Patient/family agree to work toward stated goals and plan of care. ?         Patient understands intent and goals of therapy, but is neutral about his/her participation. ? Patient is unable to participate in stated goals/plan of care: ongoing with therapy staff.   ?         Other:        Alberto Deras PTA   Time Calculation: 38 mins

## 2019-09-12 NOTE — PROGRESS NOTES
Problem: Falls - Risk of  Goal: *Absence of Falls  Description  Document Penelope Vaughn Fall Risk and appropriate interventions in the flowsheet. Outcome: Progressing Towards Goal  Note:   Fall Risk Interventions:  Mobility Interventions: Assess mobility with egress test, Bed/chair exit alarm, OT consult for ADLs, PT Consult for mobility concerns, PT Consult for assist device competence, Patient to call before getting OOB         Medication Interventions: Assess postural VS orthostatic hypotension, Bed/chair exit alarm, Patient to call before getting OOB, Evaluate medications/consider consulting pharmacy, Teach patient to arise slowly    Elimination Interventions: Bed/chair exit alarm, Call light in reach, Elevated toilet seat, Patient to call for help with toileting needs, Stay With Me (per policy), Urinal in reach              Problem: Patient Education: Go to Patient Education Activity  Goal: Patient/Family Education  Outcome: Progressing Towards Goal     Problem: Pressure Injury - Risk of  Goal: *Prevention of pressure injury  Description  Document Lukasz Scale and appropriate interventions in the flowsheet. Outcome: Progressing Towards Goal  Note:   Pressure Injury Interventions:       Moisture Interventions: Absorbent underpads, Minimize layers    Activity Interventions: Pressure redistribution bed/mattress(bed type), Assess need for specialty bed, PT/OT evaluation    Mobility Interventions: Assess need for specialty bed, Pressure redistribution bed/mattress (bed type), HOB 30 degrees or less, PT/OT evaluation, Turn and reposition approx.  every two hours(pillow and wedges)    Nutrition Interventions: Document food/fluid/supplement intake    Friction and Shear Interventions: Apply protective barrier, creams and emollients, Minimize layers, HOB 30 degrees or less                Problem: Patient Education: Go to Patient Education Activity  Goal: Patient/Family Education  Outcome: Progressing Towards Goal     Problem: Anemia Care Plan (Adult and Pediatric)  Goal: *Labs within defined limits  Outcome: Progressing Towards Goal  Goal: *Tolerates increased activity  Outcome: Progressing Towards Goal     Problem: Patient Education: Go to Patient Education Activity  Goal: Patient/Family Education  Outcome: Progressing Towards Goal     Problem: Patient Education: Go to Patient Education Activity  Goal: Patient/Family Education  Outcome: Progressing Towards Goal     Problem: Patient Education: Go to Patient Education Activity  Goal: Patient/Family Education  Outcome: Progressing Towards Goal

## 2019-09-13 LAB
BACTERIA SPEC CULT: NORMAL
BACTERIA SPEC CULT: NORMAL
CK SERPL-CCNC: 38 U/L (ref 39–308)
HCT VFR BLD AUTO: 26.9 % (ref 36–48)
HCT VFR BLD AUTO: 27.3 % (ref 36–48)
HGB BLD-MCNC: 8.5 G/DL (ref 13–16)
HGB BLD-MCNC: 8.5 G/DL (ref 13–16)
MAGNESIUM SERPL-MCNC: 2 MG/DL (ref 1.6–2.6)
SERVICE CMNT-IMP: NORMAL
SERVICE CMNT-IMP: NORMAL

## 2019-09-13 PROCEDURE — 85014 HEMATOCRIT: CPT

## 2019-09-13 PROCEDURE — 65660000000 HC RM CCU STEPDOWN

## 2019-09-13 PROCEDURE — 36415 COLL VENOUS BLD VENIPUNCTURE: CPT

## 2019-09-13 PROCEDURE — 74011636637 HC RX REV CODE- 636/637: Performed by: HOSPITALIST

## 2019-09-13 PROCEDURE — 97535 SELF CARE MNGMENT TRAINING: CPT

## 2019-09-13 PROCEDURE — 83735 ASSAY OF MAGNESIUM: CPT

## 2019-09-13 PROCEDURE — 82550 ASSAY OF CK (CPK): CPT

## 2019-09-13 PROCEDURE — 74011250637 HC RX REV CODE- 250/637: Performed by: NURSE PRACTITIONER

## 2019-09-13 PROCEDURE — 97530 THERAPEUTIC ACTIVITIES: CPT

## 2019-09-13 PROCEDURE — 74011250637 HC RX REV CODE- 250/637: Performed by: HOSPITALIST

## 2019-09-13 RX ORDER — OXYCODONE AND ACETAMINOPHEN 5; 325 MG/1; MG/1
1-2 TABLET ORAL
Qty: 10 TAB | Refills: 0 | Status: SHIPPED | OUTPATIENT
Start: 2019-09-13 | End: 2019-09-16

## 2019-09-13 RX ORDER — PANTOPRAZOLE SODIUM 40 MG/1
40 TABLET, DELAYED RELEASE ORAL
Qty: 30 TAB | Refills: 0 | Status: SHIPPED | OUTPATIENT
Start: 2019-09-14 | End: 2019-10-14

## 2019-09-13 RX ORDER — PREDNISONE 20 MG/1
20 TABLET ORAL
Qty: 3 TAB | Refills: 0 | Status: SHIPPED | OUTPATIENT
Start: 2019-09-14 | End: 2019-09-17

## 2019-09-13 RX ADMIN — APIXABAN 5 MG: 5 TABLET, FILM COATED ORAL at 09:42

## 2019-09-13 RX ADMIN — OXYCODONE HYDROCHLORIDE AND ACETAMINOPHEN 2 TABLET: 5; 325 TABLET ORAL at 06:24

## 2019-09-13 RX ADMIN — PANTOPRAZOLE SODIUM 40 MG: 40 TABLET, DELAYED RELEASE ORAL at 09:42

## 2019-09-13 RX ADMIN — PREDNISONE 20 MG: 20 TABLET ORAL at 09:42

## 2019-09-13 RX ADMIN — OXYCODONE HYDROCHLORIDE AND ACETAMINOPHEN 2 TABLET: 5; 325 TABLET ORAL at 09:45

## 2019-09-13 RX ADMIN — APIXABAN 5 MG: 5 TABLET, FILM COATED ORAL at 19:21

## 2019-09-13 RX ADMIN — OXYCODONE HYDROCHLORIDE AND ACETAMINOPHEN 2 TABLET: 5; 325 TABLET ORAL at 15:44
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
## 2019-09-13 NOTE — PROGRESS NOTES
Bedside and Verbal shift change report given to Nia Herrera (oncoming nurse) by Olive Bell  (offgoing nurse). Report included the following information SBAR, Kardex, ED Summary and MAR.

## 2019-09-13 NOTE — ROUTINE PROCESS
1950 Bedside and Verbal shift change  Received from Wilmar Godinez RN (outgoing nurse), to PEGGY Rodríguez (oncoming)  Pt. Is AOX 4. IV SL, Pt. denies  pain at this time. Report included the following information SBAR, Kardex, Procedure Summary, Intake/Output, MAR, Recent Lab Results, and  Cardiac Rhythm @ NSr. Will resume care and monitor Pt. Condition. Pt. Educated on call bell when in need of help and assistance. Pt. verbalized understanding. 2030 Pt. Head to toe Assessment Done and documented. 2105  Pt. Given pain meds per [facility] for pain management. 2200  Pt. Resting comfortably in bed, no sign of distress. 2330  Pt. Made no complaints. 0130  Pt. Resting comfortably, no sign of distress. 0300  Pt. Eyes closed, easily awaken. 0500  Pt. Able to rest well throughout the shift.    0600  Pt made no complaints.
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
## 2019-09-13 NOTE — PROGRESS NOTES
Problem: Falls - Risk of  Goal: *Absence of Falls  Description  Document Oneida Lau Fall Risk and appropriate interventions in the flowsheet.   Outcome: Progressing Towards Goal  Note:   Fall Risk Interventions:  Mobility Interventions: Bed/chair exit alarm         Medication Interventions: Assess postural VS orthostatic hypotension, Bed/chair exit alarm    Elimination Interventions: Bed/chair exit alarm, Call light in reach, Patient to call for help with toileting needs
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
## 2019-09-13 NOTE — PROGRESS NOTES
Met with patient. He states he is in a lot of pain and unable to stand. He stated he can stay with his Aunt starting Monday at [address]. He stated he would continue to call friends and family to find temporary housing before Monday. He stated he will need ride on Monday upon discharge. FOC for [facility] obtained and placed on chart.     Shaun Brandt RN BSN  Case Management [phone]
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
## 2019-09-13 NOTE — PROGRESS NOTES
Problem: Mobility Impaired (Adult and Pediatric)  Goal: *Acute Goals and Plan of Care (Insert Text)  Description  Physical Therapy Goals  Initiated 9/10/2019 and to be accomplished within 7 day(s)  1. Patient will move from supine to sit and sit to supine, scoot up and down and roll side to side in bed with moderate assistance . 2.  Patient will transfer from bed to chair and chair to bed with moderate assistance using the least restrictive device OR sliding board. 3.  Patient will perform sit to stand with moderate assistance. 4.  Patient will ambulate with moderate assistance for 5 feet with the least restrictive device. PLOF: Patient reports he was able to walk and was working and has no AD/DME at home. Patient was living with his nephew in a 2nd floor apartment. Outcome: Progressing Towards Goal   PHYSICAL THERAPY TREATMENT    Patient: Nilda Loredo (52 y.o. male)  Date: 9/13/2019  Diagnosis: Sepsis ([facility] [address] [A41.9] <principal problem not specified>  Procedure(s) (LRB):  ENDOSCOPY (N/A) 2 Days Post-Op  Precautions: Fall   Chart, physical therapy assessment, plan of care and goals were reviewed. OBJECTIVE/ ASSESSMENT:  Patient found supine in bed willing to work with PT. Patient seen with OT to maximize safety of patient and staff. Pt requires max A for all aspect of mobility this tx, reporting increased pain, primarily in left knee and right hand. Pt has noticeable edema in right hands and bilateral LEs. Pt sat at EOB and attempted to stand this tx, but was unable. Pt then attempted to scoot toward [facility] while seated and required increased time to scoot mere inches, even when bed placed in Trendelenburg position. Pt was returned to supine and left with needs in reach. Progression toward goals:  ?      Improving appropriately and progressing toward goals  ? Improving slowly and progressing toward goals  ?       Not making progress toward goals and plan of care will be adjusted PLAN:  Patient continues to benefit from skilled intervention to address the above impairments. Continue treatment per established plan of care. Discharge Recommendations:  Stephon Weinstein is homeless and has no insurance. Will need skilled assistance for mobility. Further Equipment Recommendations for Discharge:  rolling walker     SUBJECTIVE:   Patient stated I can't be out on them streets.     OBJECTIVE DATA SUMMARY:   Critical Behavior:  Neurologic State: Alert  Orientation Level: Oriented X4  Cognition: Appropriate decision making, Follows commands, Appropriate safety awareness  Safety/Judgement: Fall prevention  Functional Mobility Training:  Bed Mobility:  Rolling: Maximum assistance  Supine to Sit: Maximum assistance;Assist x2  Sit to Supine: Maximum assistance;Assist x2  Balance:  Sitting: Impaired; With support  Sitting - Static: Good (unsupported)  Sitting - Dynamic: Fair (occasional)  Standing: (Unable)      Pain:  Pain level pre-treatment: Not rated  Pain level post-treatment: Not rated    Activity Tolerance:   Poor  Please refer to the flowsheet for vital signs taken during this treatment. After treatment:   ? Patient left in no apparent distress sitting up in chair  ? Patient left in no apparent distress in bed  ? Call bell left within reach  ? Nursing notified  ? Caregiver present  ? Bed alarm activated  ? SCDs applied    COMMUNICATION/EDUCATION:   ?         Role of Physical Therapy  ? Fall prevention  ? Bed mobility  ? Transfers  ? Ambulation / gait  ? Assistive device management  ? Stairs  ? Body mechanics  ? Position change   ? Therapeutic exercise  ? Activity pacing / energy conservation  ?          Other:      Damian Snyder PTA   Time Calculation: 33 mins

## 2019-09-14 LAB
HCT VFR BLD AUTO: 25.1 % (ref 36–48)
HGB BLD-MCNC: 8 G/DL (ref 13–16)

## 2019-09-14 PROCEDURE — 77030011256 HC DRSG MEPILEX <16IN NO BORD MOLN -A

## 2019-09-14 PROCEDURE — 36415 COLL VENOUS BLD VENIPUNCTURE: CPT

## 2019-09-14 PROCEDURE — 74011250637 HC RX REV CODE- 250/637: Performed by: HOSPITALIST

## 2019-09-14 PROCEDURE — 65660000000 HC RM CCU STEPDOWN

## 2019-09-14 PROCEDURE — 74011636637 HC RX REV CODE- 636/637: Performed by: HOSPITALIST

## 2019-09-14 PROCEDURE — 74011250637 HC RX REV CODE- 250/637: Performed by: NURSE PRACTITIONER

## 2019-09-14 PROCEDURE — 85018 HEMOGLOBIN: CPT

## 2019-09-14 PROCEDURE — 77030029211 HC GEL MEDIH TU INLC -B

## 2019-09-14 RX ORDER — POLYETHYLENE GLYCOL 3350 17 G/17G
17 POWDER, FOR SOLUTION ORAL DAILY
Status: DISCONTINUED | OUTPATIENT
Start: 2019-09-14 | End: 2019-09-20 | Stop reason: HOSPADM

## 2019-09-14 RX ADMIN — PREDNISONE 20 MG: 20 TABLET ORAL at 08:03

## 2019-09-14 RX ADMIN — OXYCODONE HYDROCHLORIDE AND ACETAMINOPHEN 2 TABLET: 5; 325 TABLET ORAL at 23:48

## 2019-09-14 RX ADMIN — POLYETHYLENE GLYCOL 3350 17 G: 17 POWDER, FOR SOLUTION ORAL at 17:02

## 2019-09-14 RX ADMIN — PANTOPRAZOLE SODIUM 40 MG: 40 TABLET, DELAYED RELEASE ORAL at 08:03

## 2019-09-14 RX ADMIN — OXYCODONE HYDROCHLORIDE AND ACETAMINOPHEN 1 TABLET: 5; 325 TABLET ORAL at 17:02

## 2019-09-14 RX ADMIN — OXYCODONE HYDROCHLORIDE AND ACETAMINOPHEN 2 TABLET: 5; 325 TABLET ORAL at 02:41

## 2019-09-14 RX ADMIN — APIXABAN 5 MG: 5 TABLET, FILM COATED ORAL at 17:02

## 2019-09-14 RX ADMIN — APIXABAN 5 MG: 5 TABLET, FILM COATED ORAL at 08:03
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
## 2019-09-14 NOTE — ROUTINE PROCESS
Bedside shift change report given to Ha Noe (oncoming nurse) by  Job  RN (offgoing nurse). Report included the following information SBAR, Kardex and Recent Results.

## 2019-09-15 LAB
ANION GAP SERPL CALC-SCNC: 4 MMOL/L (ref 3–18)
BUN SERPL-MCNC: 38 MG/DL (ref 7–18)
BUN/CREAT SERPL: 30 (ref 12–20)
CALCIUM SERPL-MCNC: 9.2 MG/DL (ref 8.5–10.1)
CHLORIDE SERPL-SCNC: 102 MMOL/L (ref 100–111)
CO2 SERPL-SCNC: 29 MMOL/L (ref 21–32)
CREAT SERPL-MCNC: 1.26 MG/DL (ref 0.6–1.3)
GLUCOSE SERPL-MCNC: 95 MG/DL (ref 74–99)
HCT VFR BLD AUTO: 25.6 % (ref 36–48)
HCT VFR BLD AUTO: 25.8 % (ref 36–48)
HGB BLD-MCNC: 8 G/DL (ref 13–16)
HGB BLD-MCNC: 8.1 G/DL (ref 13–16)
POTASSIUM SERPL-SCNC: 5.1 MMOL/L (ref 3.5–5.5)
SODIUM SERPL-SCNC: 135 MMOL/L (ref 136–145)

## 2019-09-15 PROCEDURE — 74011636637 HC RX REV CODE- 636/637: Performed by: HOSPITALIST

## 2019-09-15 PROCEDURE — 74011250637 HC RX REV CODE- 250/637: Performed by: FAMILY MEDICINE

## 2019-09-15 PROCEDURE — 36415 COLL VENOUS BLD VENIPUNCTURE: CPT

## 2019-09-15 PROCEDURE — 85018 HEMOGLOBIN: CPT

## 2019-09-15 PROCEDURE — 80048 BASIC METABOLIC PNL TOTAL CA: CPT

## 2019-09-15 PROCEDURE — 74011250637 HC RX REV CODE- 250/637: Performed by: NURSE PRACTITIONER

## 2019-09-15 PROCEDURE — 74011250637 HC RX REV CODE- 250/637: Performed by: HOSPITALIST

## 2019-09-15 PROCEDURE — 65660000000 HC RM CCU STEPDOWN

## 2019-09-15 RX ORDER — PREDNISONE 10 MG/1
10 TABLET ORAL
Status: DISCONTINUED | OUTPATIENT
Start: 2019-09-16 | End: 2019-09-17

## 2019-09-15 RX ADMIN — OXYCODONE HYDROCHLORIDE AND ACETAMINOPHEN 2 TABLET: 5; 325 TABLET ORAL at 13:00

## 2019-09-15 RX ADMIN — APIXABAN 5 MG: 5 TABLET, FILM COATED ORAL at 09:05

## 2019-09-15 RX ADMIN — PREDNISONE 20 MG: 20 TABLET ORAL at 09:05

## 2019-09-15 RX ADMIN — APIXABAN 5 MG: 5 TABLET, FILM COATED ORAL at 18:58

## 2019-09-15 RX ADMIN — OXYCODONE HYDROCHLORIDE AND ACETAMINOPHEN 2 TABLET: 5; 325 TABLET ORAL at 18:58

## 2019-09-15 RX ADMIN — POLYETHYLENE GLYCOL 3350 17 G: 17 POWDER, FOR SOLUTION ORAL at 09:04

## 2019-09-15 RX ADMIN — ACETAMINOPHEN 650 MG: 325 TABLET ORAL at 09:05

## 2019-09-15 RX ADMIN — OXYCODONE HYDROCHLORIDE AND ACETAMINOPHEN 1 TABLET: 5; 325 TABLET ORAL at 06:48

## 2019-09-15 RX ADMIN — PANTOPRAZOLE SODIUM 40 MG: 40 TABLET, DELAYED RELEASE ORAL at 06:48

## 2019-09-15 NOTE — PROGRESS NOTES
Bedside shift change report given to [facility] (oncoming nurse) by Rian Ortez RN (offgoing nurse). Report included the following information SBAR, Kardex and MAR.

## 2019-09-15 NOTE — ROUTINE PROCESS
Bedside shift change report given to Donah Kussmaul., RN (oncoming nurse) by Tone Berman RN (offgoing nurse). Report included the following information SBAR, Intake/Output, MAR and Recent Results.   Dual shift change skin assesssment done with oncoming RN

## 2019-09-15 NOTE — PROGRESS NOTES
Problem: Falls - Risk of  Goal: *Absence of Falls  Description  Document Penelope Vaughn Fall Risk and appropriate interventions in the flowsheet. Outcome: Progressing Towards Goal  Note:   Fall Risk Interventions:  Mobility Interventions: Bed/chair exit alarm, Communicate number of staff needed for ambulation/transfer, Patient to call before getting OOB, PT Consult for mobility concerns, PT Consult for assist device competence         Medication Interventions: Evaluate medications/consider consulting pharmacy, Bed/chair exit alarm    Elimination Interventions: Call light in reach, Patient to call for help with toileting needs, Urinal in reach              Problem: Patient Education: Go to Patient Education Activity  Goal: Patient/Family Education  Outcome: Progressing Towards Goal     Problem: Pressure Injury - Risk of  Goal: *Prevention of pressure injury  Description  Document Lukasz Scale and appropriate interventions in the flowsheet.   Outcome: Progressing Towards Goal  Note:   Pressure Injury Interventions:       Moisture Interventions: Apply protective barrier, creams and emollients, Check for incontinence Q2 hours and as needed, Maintain skin hydration (lotion/cream)    Activity Interventions: Pressure redistribution bed/mattress(bed type)    Mobility Interventions: HOB 30 degrees or less, Pressure redistribution bed/mattress (bed type), PT/OT evaluation    Nutrition Interventions: Document food/fluid/supplement intake, Offer support with meals,snacks and hydration    Friction and Shear Interventions: Foam dressings/transparent film/skin sealants, HOB 30 degrees or less, Minimize layers, Lift team/patient mobility team                Problem: Patient Education: Go to Patient Education Activity  Goal: Patient/Family Education  Outcome: Progressing Towards Goal     Problem: Anemia Care Plan (Adult and Pediatric)  Goal: *Labs within defined limits  Outcome: Progressing Towards Goal  Goal: *Tolerates increased activity  Outcome: Progressing Towards Goal     Problem: Patient Education: Go to Patient Education Activity  Goal: Patient/Family Education  Outcome: Progressing Towards Goal     Problem: Patient Education: Go to Patient Education Activity  Goal: Patient/Family Education  Outcome: Progressing Towards Goal     Problem: Patient Education: Go to Patient Education Activity  Goal: Patient/Family Education  Outcome: Progressing Towards Goal     Problem: Nutrition Deficit  Goal: *Optimize nutritional status  Outcome: Progressing Towards Goal
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
## 2019-09-16 LAB
ERYTHROCYTE [DISTWIDTH] IN BLOOD BY AUTOMATED COUNT: 16.3 % (ref 11.6–14.5)
HCT VFR BLD AUTO: 26.2 % (ref 36–48)
HGB BLD-MCNC: 8.2 G/DL (ref 13–16)
MCH RBC QN AUTO: 27.8 PG (ref 24–34)
MCHC RBC AUTO-ENTMCNC: 31.3 G/DL (ref 31–37)
MCV RBC AUTO: 88.8 FL (ref 74–97)
PLATELET # BLD AUTO: 562 K/UL (ref 135–420)
PMV BLD AUTO: 9.7 FL (ref 9.2–11.8)
RBC # BLD AUTO: 2.95 M/UL (ref 4.7–5.5)
WBC # BLD AUTO: 18.1 K/UL (ref 4.6–13.2)

## 2019-09-16 PROCEDURE — 74011250637 HC RX REV CODE- 250/637: Performed by: NURSE PRACTITIONER

## 2019-09-16 PROCEDURE — 97530 THERAPEUTIC ACTIVITIES: CPT

## 2019-09-16 PROCEDURE — 65660000000 HC RM CCU STEPDOWN

## 2019-09-16 PROCEDURE — 97110 THERAPEUTIC EXERCISES: CPT

## 2019-09-16 PROCEDURE — 74011636637 HC RX REV CODE- 636/637: Performed by: NURSE PRACTITIONER

## 2019-09-16 PROCEDURE — 36415 COLL VENOUS BLD VENIPUNCTURE: CPT

## 2019-09-16 PROCEDURE — 74011250637 HC RX REV CODE- 250/637: Performed by: HOSPITALIST

## 2019-09-16 PROCEDURE — 85027 COMPLETE CBC AUTOMATED: CPT

## 2019-09-16 PROCEDURE — 94762 N-INVAS EAR/PLS OXIMTRY CONT: CPT

## 2019-09-16 RX ORDER — POLYETHYLENE GLYCOL 3350 17 G/17G
17 POWDER, FOR SOLUTION ORAL DAILY
Qty: 30 PACKET | Refills: 0 | Status: SHIPPED | OUTPATIENT
Start: 2019-09-17 | End: 2019-10-17

## 2019-09-16 RX ORDER — PREDNISONE 10 MG/1
10 TABLET ORAL
Qty: 2 TAB | Refills: 0 | Status: SHIPPED | OUTPATIENT
Start: 2019-09-17 | End: 2019-09-17

## 2019-09-16 RX ADMIN — POLYETHYLENE GLYCOL 3350 17 G: 17 POWDER, FOR SOLUTION ORAL at 08:35

## 2019-09-16 RX ADMIN — APIXABAN 5 MG: 5 TABLET, FILM COATED ORAL at 08:35

## 2019-09-16 RX ADMIN — PANTOPRAZOLE SODIUM 40 MG: 40 TABLET, DELAYED RELEASE ORAL at 08:35

## 2019-09-16 RX ADMIN — APIXABAN 5 MG: 5 TABLET, FILM COATED ORAL at 17:37

## 2019-09-16 RX ADMIN — OXYCODONE HYDROCHLORIDE AND ACETAMINOPHEN 1 TABLET: 5; 325 TABLET ORAL at 16:13

## 2019-09-16 RX ADMIN — PREDNISONE 10 MG: 10 TABLET ORAL at 08:35

## 2019-09-16 RX ADMIN — OXYCODONE HYDROCHLORIDE AND ACETAMINOPHEN 2 TABLET: 5; 325 TABLET ORAL at 01:08

## 2019-09-16 NOTE — PROGRESS NOTES
Pt says he is homeless - says none of his family will take him in - said that he has an aunt who was willing to take him in but now she is not answering - I believe he was making it up to stay the weekend  Had a long discussion with him , he refuses to go to the group home   Discussed with CM - will likely to try contract at Nursing facility

## 2019-09-16 NOTE — ROUTINE PROCESS
Bedside and Verbal shift change report given to Yves England RN (oncoming nurse) by Devyn Hill RN (offgoing nurse). Report included the following information SBAR, Kardex, Recent Results and Cardiac Rhythm sinus rhythm.

## 2019-09-16 NOTE — PROGRESS NOTES
Problem: Falls - Risk of  Goal: *Absence of Falls  Description  Document Ayaan Salgado Fall Risk and appropriate interventions in the flowsheet. 9/16/2019 1120 by Chyna Causey  Outcome: Progressing Towards Goal  Note:   Fall Risk Interventions:  Mobility Interventions: PT Consult for mobility concerns         Medication Interventions: Evaluate medications/consider consulting pharmacy    Elimination Interventions: Call light in reach, Patient to call for help with toileting needs           9/16/2019 1120 by Chyna Causey  Outcome: Progressing Towards Goal  Note:   Fall Risk Interventions:  Mobility Interventions: PT Consult for mobility concerns         Medication Interventions: Evaluate medications/consider consulting pharmacy    Elimination Interventions: Call light in reach, Patient to call for help with toileting needs              Problem: Patient Education: Go to Patient Education Activity  Goal: Patient/Family Education  9/16/2019 1120 by Chyna Causey  Outcome: Progressing Towards Goal  9/16/2019 1120 by Chyna Causey  Outcome: Progressing Towards Goal     Problem: Pressure Injury - Risk of  Goal: *Prevention of pressure injury  Description  Document Lukasz Scale and appropriate interventions in the flowsheet.   9/16/2019 1120 by Chyna Causey  Outcome: Progressing Towards Goal  Note:   Pressure Injury Interventions:       Moisture Interventions: Apply protective barrier, creams and emollients, Check for incontinence Q2 hours and as needed, Maintain skin hydration (lotion/cream)    Activity Interventions: Increase time out of bed, Pressure redistribution bed/mattress(bed type), PT/OT evaluation    Mobility Interventions: HOB 30 degrees or less, Pressure redistribution bed/mattress (bed type), PT/OT evaluation    Nutrition Interventions: Document food/fluid/supplement intake    Friction and Shear Interventions: Apply protective barrier, creams and emollients, Feet elevated on foot rest, HOB 30 degrees or less             9/16/2019 1120 by [facility]  Outcome: Progressing Towards Goal  Note:   Pressure Injury Interventions:       Moisture Interventions: Apply protective barrier, creams and emollients, Check for incontinence Q2 hours and as needed, Maintain skin hydration (lotion/cream)    Activity Interventions: Increase time out of bed, Pressure redistribution bed/mattress(bed type), PT/OT evaluation    Mobility Interventions: HOB 30 degrees or less, Pressure redistribution bed/mattress (bed type), PT/OT evaluation    Nutrition Interventions: Document food/fluid/supplement intake    Friction and Shear Interventions: Apply protective barrier, creams and emollients, Feet elevated on foot rest, HOB 30 degrees or less                Problem: Patient Education: Go to Patient Education Activity  Goal: Patient/Family Education  9/16/2019 1120 by [facility]  Outcome: Progressing Towards Goal  9/16/2019 1120 by [facility]  Outcome: Progressing Towards Goal     Problem: Anemia Care Plan (Adult and Pediatric)  Goal: *Labs within defined limits  9/16/2019 1120 by [facility]  Outcome: Progressing Towards Goal  9/16/2019 1120 by [facility]  Outcome: Progressing Towards Goal  Goal: *Tolerates increased activity  9/16/2019 1120 by [facility]  Outcome: Progressing Towards Goal  9/16/2019 1120 by [facility]  Outcome: Progressing Towards Goal     Problem: Patient Education: Go to Patient Education Activity  Goal: Patient/Family Education  9/16/2019 1120 by [facility]  Outcome: Progressing Towards Goal  9/16/2019 1120 by [facility]  Outcome: Progressing Towards Goal     Problem: Patient Education: Go to Patient Education Activity  Goal: Patient/Family Education  9/16/2019 1120 by [facility]  Outcome: Progressing Towards Goal  9/16/2019 1120 by [facility]  Outcome: Progressing Towards Goal     Problem: Patient Education: Go to Patient Education Activity  Goal: Patient/Family Education  9/16/2019 1120 by Delmy Jackson  Outcome: Progressing Towards Goal  9/16/2019 1120 by Delmy Jackson  Outcome: Progressing Towards Goal     Problem: Nutrition Deficit  Goal: *Optimize nutritional status  9/16/2019 1120 by Delmy Jackson  Outcome: Progressing Towards Goal  9/16/2019 1120 by Delmy Jackson  Outcome: Progressing Towards Goal
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
## 2019-09-16 NOTE — PROGRESS NOTES
conducted a Follow up consultation and Spiritual Assessment for Brandon Antonio, who is a 60 y. o.,male. The  provided the following Interventions:  Continued the relationship of care and support. Listened empathically. Offered prayer and assurance of continued prayer on patients behalf. Chart reviewed. The following outcomes were achieved:  Patient expressed gratitude for 's visit. Assessment:  There are no further spiritual or Buddhism issues which require Spiritual Care Services interventions at this time. Plan:  Chaplains will continue to follow and will provide pastoral care on an as needed/requested basis.  recommends bedside caregivers page  on duty if patient shows signs of acute spiritual or emotional distress.        [address] [facility]   [phone]

## 2019-09-16 NOTE — PROGRESS NOTES
Bedside shift change report given to Rock Ty RN (oncoming nurse) by Chantale Gil RN (offgoing nurse). Report included the following information STEVANAR and MAR.

## 2019-09-16 NOTE — PROGRESS NOTES
Problem: Mobility Impaired (Adult and Pediatric)  Goal: *Acute Goals and Plan of Care (Insert Text)  Description  Physical Therapy Goals  Initiated 9/10/2019 and to be accomplished within 7 day(s)  1. Patient will move from supine to sit and sit to supine, scoot up and down and roll side to side in bed with moderate assistance . 2.  Patient will transfer from bed to chair and chair to bed with moderate assistance using the least restrictive device OR sliding board. 3.  Patient will perform sit to stand with moderate assistance. 4.  Patient will ambulate with moderate assistance for 5 feet with the least restrictive device. PLOF: Patient reports he was able to walk and was working and has no AD/DME at home. Patient was living with his nephew in a 2nd floor apartment. Outcome: Progressing Towards Goal   PHYSICAL THERAPY TREATMENT    Patient: Madan Del Rosario (62 y.o. male)  Date: 9/16/2019  Diagnosis: Sepsis ([facility] [address] [A41.9] <principal problem not specified>  Procedure(s) (LRB):  ENDOSCOPY (N/A) 5 Days Post-Op  Precautions: Fall    ASSESSMENT:  Pt continues to be limited significantly by pain with mobility. B LE exercises performed prior to initiating supine to sit transfer. Pt required max A for supine to sit transfer to assist with LEs. Pt demonstrated good static sitting balance at the edge of the bed. Pt performed seated right knee flexion however only achieved approximately 60 degrees of flexion. Pt returned to supine with max A to raise LEs and pulled himself up with mod A when the bed was placed in trendelenburg. Progression toward goals:   ?      Improving appropriately and progressing toward goals  ? Improving slowly and progressing toward goals  ? Not making progress toward goals and plan of care will be adjusted     PLAN:  Patient continues to benefit from skilled intervention to address the above impairments. Continue treatment per established plan of care.   Discharge Recommendations:  Skilled Nursing Facility  Further Equipment Recommendations for Discharge:  N/A     SUBJECTIVE:   Patient stated I really feel like I made some progress today.     OBJECTIVE DATA SUMMARY:   Critical Behavior:  Neurologic State: Alert, Eyes open spontaneously  Orientation Level: Oriented X4  Cognition: Appropriate decision making, Appropriate safety awareness, Follows commands, Recognition of people/places  Safety/Judgement: Fall prevention  Functional Mobility Training:  Bed Mobility:  Rolling: Moderate assistance  Supine to Sit: Maximum assistance  Sit to Supine: Maximum assistance  Scooting: Moderate assistance(with bed in trendelenburg)     Balance:  Sitting - Static: Good (unsupported)   Therapeutic Exercises:         EXERCISE   Sets   Reps   Active Active Assist   Passive Self ROM   Comments   Ankle Pumps 1 10  ? ? ? ?    Quad Sets/Glut Sets    ? ? ? ? Hold for 5 secs   Hamstring Sets   ? ? ? ? Short Arc Quads   ? ? ? ? Heel Slides 1 10 ? ? ? ? Straight Leg Raises   ? ? ? ? Hip Add   ? ? ? ? Hold for 5 secs, w/ pillow squeeze   Long Arc Quads   ? ? ? ? Seated Marching   ? ? ? ? Standing Marching   ? ? ? ? Seated knee flexion 1 10 ? ? ? ? Pain:  Pain level pre-treatment: 8/10  Pain level post-treatment: 8/10   Pain Intervention(s): Medication requested by nurse  Response to intervention: Nurse notified    Activity Tolerance:   poor  Please refer to the flowsheet for vital signs taken during this treatment. After treatment:   ? Patient left in no apparent distress sitting up in chair  ? Patient left in no apparent distress in bed  ? Call bell left within reach  ? Nursing notified  ? Caregiver present  ? Bed alarm activated  ? SCDs applied      COMMUNICATION/EDUCATION:   ?         Role of Physical Therapy in the acute care setting. ?         Fall prevention education was provided and the patient/caregiver indicated understanding. ?          Patient/family have participated as able in working toward goals and plan of care. ?         Patient/family agree to work toward stated goals and plan of care. ?         Patient understands intent and goals of therapy, but is neutral about his/her participation. ? Patient is unable to participate in stated goals/plan of care: ongoing with therapy staff.   ?         Other:        Sima Campbell, PT   Time Calculation: 32 mins

## 2019-09-17 LAB
ANION GAP SERPL CALC-SCNC: 6 MMOL/L (ref 3–18)
BASOPHILS # BLD: 0 K/UL (ref 0–0.06)
BASOPHILS NFR BLD: 0 % (ref 0–3)
BUN SERPL-MCNC: 41 MG/DL (ref 7–18)
BUN/CREAT SERPL: 34 (ref 12–20)
CALCIUM SERPL-MCNC: 9.5 MG/DL (ref 8.5–10.1)
CHLORIDE SERPL-SCNC: 102 MMOL/L (ref 100–111)
CO2 SERPL-SCNC: 26 MMOL/L (ref 21–32)
CREAT SERPL-MCNC: 1.21 MG/DL (ref 0.6–1.3)
DIFFERENTIAL METHOD BLD: ABNORMAL
EOSINOPHIL # BLD: 0.2 K/UL (ref 0–0.4)
EOSINOPHIL NFR BLD: 1 % (ref 0–5)
ERYTHROCYTE [DISTWIDTH] IN BLOOD BY AUTOMATED COUNT: 16.5 % (ref 11.6–14.5)
GLUCOSE SERPL-MCNC: 91 MG/DL (ref 74–99)
HCT VFR BLD AUTO: 27.4 % (ref 36–48)
HGB BLD-MCNC: 8.7 G/DL (ref 13–16)
LYMPHOCYTES # BLD: 2.4 K/UL (ref 0.8–3.5)
LYMPHOCYTES NFR BLD: 13 % (ref 20–51)
MCH RBC QN AUTO: 28 PG (ref 24–34)
MCHC RBC AUTO-ENTMCNC: 31.8 G/DL (ref 31–37)
MCV RBC AUTO: 88.1 FL (ref 74–97)
MONOCYTES # BLD: 1.8 K/UL (ref 0–1)
MONOCYTES NFR BLD: 10 % (ref 2–9)
NEUTS BAND NFR BLD MANUAL: 1 % (ref 0–5)
NEUTS SEG # BLD: 14 K/UL (ref 1.8–8)
NEUTS SEG NFR BLD: 75 % (ref 42–75)
PLATELET # BLD AUTO: 549 K/UL (ref 135–420)
PLATELET COMMENTS,PCOM: ABNORMAL
PMV BLD AUTO: 10 FL (ref 9.2–11.8)
POTASSIUM SERPL-SCNC: 5.3 MMOL/L (ref 3.5–5.5)
POTASSIUM SERPL-SCNC: 5.7 MMOL/L (ref 3.5–5.5)
RBC # BLD AUTO: 3.11 M/UL (ref 4.7–5.5)
RBC MORPH BLD: ABNORMAL
SODIUM SERPL-SCNC: 134 MMOL/L (ref 136–145)
WBC # BLD AUTO: 18.4 K/UL (ref 4.6–13.2)

## 2019-09-17 PROCEDURE — 74011636637 HC RX REV CODE- 636/637: Performed by: NURSE PRACTITIONER

## 2019-09-17 PROCEDURE — 36415 COLL VENOUS BLD VENIPUNCTURE: CPT

## 2019-09-17 PROCEDURE — 85025 COMPLETE CBC W/AUTO DIFF WBC: CPT

## 2019-09-17 PROCEDURE — 97530 THERAPEUTIC ACTIVITIES: CPT

## 2019-09-17 PROCEDURE — 97535 SELF CARE MNGMENT TRAINING: CPT

## 2019-09-17 PROCEDURE — 97168 OT RE-EVAL EST PLAN CARE: CPT

## 2019-09-17 PROCEDURE — 84132 ASSAY OF SERUM POTASSIUM: CPT

## 2019-09-17 PROCEDURE — 97164 PT RE-EVAL EST PLAN CARE: CPT

## 2019-09-17 PROCEDURE — 65660000000 HC RM CCU STEPDOWN

## 2019-09-17 PROCEDURE — 74011250637 HC RX REV CODE- 250/637: Performed by: NURSE PRACTITIONER

## 2019-09-17 PROCEDURE — 74011250637 HC RX REV CODE- 250/637: Performed by: HOSPITALIST

## 2019-09-17 RX ORDER — ACETAMINOPHEN 325 MG/1
650 TABLET ORAL
Qty: 30 TAB | Refills: 0 | Status: SHIPPED | OUTPATIENT
Start: 2019-09-17

## 2019-09-17 RX ORDER — OXYCODONE AND ACETAMINOPHEN 5; 325 MG/1; MG/1
1 TABLET ORAL
Status: DISCONTINUED | OUTPATIENT
Start: 2019-09-17 | End: 2019-09-20 | Stop reason: HOSPADM

## 2019-09-17 RX ORDER — ALLOPURINOL 100 MG/1
100 TABLET ORAL DAILY
Status: DISCONTINUED | OUTPATIENT
Start: 2019-09-18 | End: 2019-09-20 | Stop reason: HOSPADM

## 2019-09-17 RX ORDER — ALLOPURINOL 100 MG/1
100 TABLET ORAL DAILY
Qty: 30 TAB | Refills: 0 | Status: SHIPPED
Start: 2019-09-18 | End: 2019-10-18

## 2019-09-17 RX ADMIN — OXYCODONE HYDROCHLORIDE AND ACETAMINOPHEN 1 TABLET: 5; 325 TABLET ORAL at 06:39

## 2019-09-17 RX ADMIN — OXYCODONE HYDROCHLORIDE AND ACETAMINOPHEN 2 TABLET: 5; 325 TABLET ORAL at 12:31

## 2019-09-17 RX ADMIN — PREDNISONE 10 MG: 10 TABLET ORAL at 09:08

## 2019-09-17 RX ADMIN — APIXABAN 5 MG: 5 TABLET, FILM COATED ORAL at 17:47

## 2019-09-17 RX ADMIN — PANTOPRAZOLE SODIUM 40 MG: 40 TABLET, DELAYED RELEASE ORAL at 09:08

## 2019-09-17 RX ADMIN — OXYCODONE HYDROCHLORIDE AND ACETAMINOPHEN 1 TABLET: 5; 325 TABLET ORAL at 00:42

## 2019-09-17 RX ADMIN — APIXABAN 5 MG: 5 TABLET, FILM COATED ORAL at 09:08

## 2019-09-17 NOTE — PROGRESS NOTES
Spoke with patient about discharge disposition. Eddie Anastasia, patient friend, is unable to take patient to his home. Will continue to follow.   Noris Alvarez RN BSN  Case Management [phone]

## 2019-09-17 NOTE — PROGRESS NOTES
Discharge planning:    Patient's referral was sent over to [facility] for a contract stay. He is Medicaid pending. Colette Quiroga, verified that this writer could send the referral to the SNF. Akira Villagran MSW  Care Manager  Pager#: [phone]
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
## 2019-09-17 NOTE — PROGRESS NOTES
Problem: Mobility Impaired (Adult and Pediatric)  Goal: *Acute Goals and Plan of Care (Insert Text)  Description  Physical Therapy Goals  Initiated 9/10/2019 and to be accomplished within 7 day(s)  1. Patient will move from supine to sit and sit to supine, scoot up and down and roll side to side in bed with moderate assistance . 2.  Patient will transfer from bed to chair and chair to bed with moderate assistance using the least restrictive device OR sliding board. 3.  Patient will perform sit to stand with moderate assistance. 4.  Patient will ambulate with moderate assistance for 5 feet with the least restrictive device. PLOF: Patient reports he was able to walk and was working and has no AD/DME at home. Patient was living with his nephew in a 2nd floor apartment. Outcome: Progressing Towards Goal  PHYSICAL THERAPY RE-EVALUATION    Patient: Riley Krishnamurthy (36 y.o. male)  Date: 9/17/2019  Primary Diagnosis: Sepsis ([facility] [address] [A41.9]  Procedure(s) (LRB):  ENDOSCOPY (N/A) 6 Days Post-Op   Precautions: Skin,  Fall  PLOF: See goals section above    ASSESSMENT :  Pt was cleared by nursing to participate in therapy. Pt was received semi-reclined in bed, agreeable to work with PT . Pt was seen with OT to maximize pt safety and participation. Pt reported severe, debilitating pain in R UE and R LE which he attributed to gout. Pt unable to perform SLR in supine with either  R LE or L LE . Pt required max A x 2 for supine-sit transfer, with frequent verbal and tactile cues for hand placement and technique. Pt required max A to scoot to EOB . He reported significant pain in bilateral LEs with transition, but after he was on EOB he could tolerate the pain and displayed good sitting balance. Assessed pt's ability to perform LAQ and he could initiate the movement but not move LE through full ROM against gravity. He was unable to move R LE against gravity.   Unsafe to attempt standing today due to pain in R LE and R UE as well as bilateral LE weakness. Pt scooted laterally toward HOB with max A and verbal cues to use stronger L side to push himself laterally. Pt was then assisted back into supine with max A x 2. At conclusion of session, pt's LEs were elevated on pillows, R UE was elevated on pillow and pt was left resting comfortably in bed, needs met,nursing notified, call bell in reach. Patient will benefit from skilled intervention to address the above impairments. Patient's rehabilitation potential is considered to be Good  Factors which may influence rehabilitation potential include:   ? None noted  ? Mental ability/status  ? Medical condition  ? Home/family situation and support systems  ? Safety awareness  ? Pain tolerance/management  ? Other:      PLAN :  Recommendations and Planned Interventions:   ?           Bed Mobility Training             ? Neuromuscular Re-Education  ? Transfer Training                   ? Orthotic/Prosthetic Training  ? Gait Training                          ? Modalities  ? Therapeutic Exercises           ? Edema Management/Control  ? Therapeutic Activities            ? Family Training/Education  ? Patient Education  ? Other (comment):    Frequency/Duration: Patient will be followed by physical therapy 1-2 times per day/4-7 days per week to address goals. Discharge Recommendations: Rehab  Further Equipment Recommendations for Discharge: N/A     SUBJECTIVE:   Patient stated  I appreciate what you do.     OBJECTIVE DATA SUMMARY:   Hospital course since last seen and reason for re-evaluation: Pt has been treated for sepsis since his admission and has been participating in therapy services. Pt is being re-evaluated to assess progress made in therapy thus far. History reviewed. No pertinent past medical history. No past surgical history on file.   Barriers to Learning/Limitations: None  Compensate with: N/A  Home Situation:   Home Situation  Home Environment: Private residence  # Steps to Enter: 0  One/Two Story Residence: One story  Living Alone: Yes  Support Systems: Friends \ neighbors  Patient Expects to be Discharged to[de-identified] Private residence  Current DME Used/Available at Home: Rosa Pickler, rollator  Tub or Shower Type: Tub/Shower combination  Critical Behavior:  Neurologic State: Alert  Orientation Level: Oriented to person;Oriented to place;Oriented to situation  Cognition: Follows commands  Safety/Judgement: Awareness of environment; Insight into deficits  Psychosocial  Patient Behaviors: Cooperative; Tearful  Purposeful Interaction: Yes  Pt Identified Daily Priority: Clinical issues (comment)  Caritas Process: Nurture loving kindness;Establish trust;Attend basic human needs  Caring Interventions: Reassure; Therapeutic modalities  Reassure: Therapeutic listening;Caring rounds  Therapeutic Modalities: Humor  Skin Condition/Temp: Warm;Dry     Skin Integrity: Excoriation  Skin Integumentary  Skin Color: Appropriate for ethnicity  Skin Condition/Temp: Warm;Dry  Skin Integrity: Excoriation  Turgor: Non-tenting  Hair Growth: Present  Varicosities: Absent     Strength:    Strength: Generally decreased, functional  Tone & Sensation:   Tone: Normal    Sensation: Intact    Range Of Motion:  AROM: Generally decreased, functional    PROM: Generally decreased, functional    Functional Mobility:  Bed Mobility:  Supine to Sit: Maximum assistance;Assist x2  Sit to Supine: Maximum assistance;Assist x2  Scooting: Maximum assistance;Assist x2    Balance:   Sitting - Static: Good (unsupported)  Sitting - Dynamic: Fair (occasional)    Pain:  Pain level pre-treatment: 6/10 ( R hand, R foot)  Pain level post-treatment: 10/10  (during transitional movements)  Pain Intervention(s) : Medication (see MAR);  Rest, Repositioning   Response to intervention: Nurse notified, See doc flow    Activity Tolerance:   Fair  Please refer to the flowsheet for vital signs taken during this treatment. After treatment:   ?         Patient left in no apparent distress sitting up in chair  ? Patient left in no apparent distress in bed  ? Call bell left within reach  ? Nursing notified  ? Caregiver present  ? Bed alarm activated  ? SCDs applied    COMMUNICATION/EDUCATION:   ?         Role of Physical Therapy in the acute care setting. ?         Fall prevention education was provided and the patient/caregiver indicated understanding. ? Patient/family have participated as able in goal setting and plan of care. ?         Patient/family agree to work toward stated goals and plan of care. ?         Patient understands intent and goals of therapy, but is neutral about his/her participation. ? Patient is unable to participate in goal setting/plan of care: ongoing with therapy staff. ?         Other:     Thank you for this referral.  Patricia Riggins, PT   Time Calculation: 27 mins

## 2019-09-17 NOTE — PROGRESS NOTES
[employer] screening completed for Nursing Facility.  Will continue to Monitor '  Lakhwinder Palencia RN BSN  Case Management [phone]
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
## 2019-09-17 NOTE — PROGRESS NOTES
CM is trying to place this pt in a SNF - trying to arrange for a bed , will follow   Anticipate discharge when bed available

## 2019-09-17 NOTE — ROUTINE PROCESS
Bedside and Verbal shift change report given by Tarun Gregory RN (offgoingnurse) to Amber Garcia RN (oncoming nurse). Report included the following information SBAR, Kardex and MAR.

## 2019-09-17 NOTE — PROGRESS NOTES
Problem: Falls - Risk of  Goal: *Absence of Falls  Description  Document Ernesto Allen Fall Risk and appropriate interventions in the flowsheet.   Outcome: Progressing Towards Goal  Note:   Fall Risk Interventions:  Mobility Interventions: Patient to call before getting OOB         Medication Interventions: Bed/chair exit alarm, Patient to call before getting OOB    Elimination Interventions: Bed/chair exit alarm, Call light in reach

## 2019-09-17 NOTE — ROUTINE PROCESS
Bedside shift change report given to [facility], rn (oncoming nurse) by Mac Burkitt (offgoing nurse). Report included the following information SBAR, Kardex and MAR.

## 2019-09-17 NOTE — ROUTINE PROCESS
Bedside and Verbal shift change report given to Fields RN (oncoming nurse) by Yudith Lopes RN (offgoing nurse). Report included the following information SBAR, Kardex and MAR.

## 2019-09-18 PROCEDURE — 74011250637 HC RX REV CODE- 250/637: Performed by: HOSPITALIST

## 2019-09-18 PROCEDURE — 74011250637 HC RX REV CODE- 250/637: Performed by: FAMILY MEDICINE

## 2019-09-18 PROCEDURE — 65660000000 HC RM CCU STEPDOWN

## 2019-09-18 PROCEDURE — 74011250637 HC RX REV CODE- 250/637: Performed by: NURSE PRACTITIONER

## 2019-09-18 RX ORDER — TRAMADOL HYDROCHLORIDE 50 MG/1
50 TABLET ORAL
Qty: 10 TAB | Refills: 0 | Status: SHIPPED | OUTPATIENT
Start: 2019-09-18 | End: 2019-09-19 | Stop reason: SDUPTHER

## 2019-09-18 RX ADMIN — APIXABAN 5 MG: 5 TABLET, FILM COATED ORAL at 08:37

## 2019-09-18 RX ADMIN — ACETAMINOPHEN 650 MG: 325 TABLET ORAL at 20:46

## 2019-09-18 RX ADMIN — OXYCODONE HYDROCHLORIDE AND ACETAMINOPHEN 1 TABLET: 5; 325 TABLET ORAL at 08:51

## 2019-09-18 RX ADMIN — OXYCODONE HYDROCHLORIDE AND ACETAMINOPHEN 1 TABLET: 5; 325 TABLET ORAL at 00:11

## 2019-09-18 RX ADMIN — OXYCODONE HYDROCHLORIDE AND ACETAMINOPHEN 1 TABLET: 5; 325 TABLET ORAL at 17:36

## 2019-09-18 RX ADMIN — ALLOPURINOL 100 MG: 100 TABLET ORAL at 08:37

## 2019-09-18 RX ADMIN — POLYETHYLENE GLYCOL 3350 17 G: 17 POWDER, FOR SOLUTION ORAL at 08:36

## 2019-09-18 RX ADMIN — PANTOPRAZOLE SODIUM 40 MG: 40 TABLET, DELAYED RELEASE ORAL at 08:37

## 2019-09-18 RX ADMIN — APIXABAN 5 MG: 5 TABLET, FILM COATED ORAL at 17:36

## 2019-09-18 NOTE — PROGRESS NOTES
NUTRITION    Nutrition Screen      RECOMMENDATIONS / PLAN:     - Continue current nutrition interventions. - Continue RD inpatient monitoring and evaluation. NUTRITION INTERVENTIONS & DIAGNOSIS:     [x] Meals/snacks: modified composition  [x] Medical food supplement therapy: Ensure Enlive BID    Nutrition Diagnosis:  Inadequate oral intake related to decreased appetite as evidenced by 50% of usual po intake PTA, variable po intake of meals since admission, sometimes consuming 50% of meals. ASSESSMENT:     9/18: Appetite and meal intake improved, noted plan for discharge. 9/13: Pt reported fair appetite and meal intake PTA and upon admission, starting to improve. Stated the food is good, but fair po intake some meals. Agreeable to trying Ensure nutrition supplements.      Average po intake adequate to meet patients estimated nutritional needs:   [] Yes     [x] No   [] Unable to determine at this time    Diet: DIET CARDIAC Regular  DIET NUTRITIONAL SUPPLEMENTS Breakfast, Dinner; ENSURE ENLIVE      Food Allergies:  NKFA  Current Appetite:   [x] Good     [] Fair     [] Poor     [] Other:  Appetite/meal intake prior to admission:   [] Good     [x] Fair     [] Poor     [] Other:  Feeding Limitations:  [] Swallowing difficulty    [] Chewing difficulty    [] Other:  Current Meal Intake:   Patient Vitals for the past 100 hrs:   % Diet Eaten   09/18/19 1335 85 %   09/18/19 0930 100 %   09/17/19 1859 100 %   09/17/19 1300 95 %   09/17/19 0909 100 %   09/16/19 1758 80 %   09/15/19 1304 90 %   09/15/19 0908 75 %       BM: 9/17  Skin Integrity:  Left ankle trauma wound;  Closed scar right posterior calf;  Left lateral foot trauma wound;  Gluteal fold moisture associated skin damage    Edema:   [x] No     [] Yes   Pertinent Medications: Reviewed: zofran, protonix, steroid    Recent Labs     09/17/19  0945 09/17/19  0350   NA  --  134*   K 5.3 5.7*   CL  --  102   CO2  --  26   GLU  --  91   BUN  --  41*   CREA  --  1.21 CA  --  9.5       Intake/Output Summary (Last 24 hours) at 9/18/2019 1611  Last data filed at 9/18/2019 1120  Gross per 24 hour   Intake 800 ml   Output 2050 ml   Net -1250 ml       Anthropometrics:  Ht Readings from Last 1 Encounters:   09/08/19 6' 2\" (1.88 m)     Last 3 Recorded Weights in this Encounter    09/12/19 1526 09/15/19 0649 09/17/19 1535   Weight: 133.5 kg (294 lb 5 oz) 127.2 kg (280 lb 8 oz) 128.5 kg (283 lb 4.8 oz)     Body mass index is 36.37 kg/m². Obese, Class II    Weight History:  Pt reported UBW is 270-275 lb. Weight Metrics 9/17/2019 9/7/2019   Weight 283 lb 4.8 oz -   BMI - 36.37 kg/m2        Admitting Diagnosis: Sepsis ([facility] [address] [A41.9]  Pertinent PMHx:  No past medical hx on file    Education Needs:        [x] None identified  [] Identified - Not appropriate at this time  []  Identified and addressed - refer to education log  Learning Limitations:   [x] None identified  [] Identified    Cultural, Roman Catholic & ethnic food preferences:  [x] None identified    [] Identified and addressed     ESTIMATED NUTRITION NEEDS:     Calories: 4837-6340 kcal (25-30 kcal/kg) based on  [] Actual BW      [x] IBW: 86 kg   Protein: 107-134 gm (0.8-1 gm/kg) based on  [x] Actual BW: 134 kg     [] IBW   Fluid: 1 mL/kcal     MONITORING & EVALUATION:     Nutrition Goal(s):   1. Po intake of meals will meet >75% of patient estimated nutritional needs within the next 7 days. Outcome:  [x] Met/Ongoing    [] Progressing towards goal    [] Not progressing towards goal    [] New/Initial goal    Monitoring:   [x] Food and beverage intake   [x] Diet order   [x] Nutrition-focused physical findings   [x] Treatment/therapy   [] Weight   [] Enteral nutrition intake        Previous Recommendations (for follow-up assessments only):     [x]   Implemented       []   Not Implemented (RD to address)      [] No Longer Appropriate     [] No Recommendation Made     Discharge Planning: No nutritional discharge needs at this time. [x] Participated in care planning, discharge planning, & interdisciplinary rounds as appropriate      Gema Barillas, [address] [place] [address], [address] [place]    Pager: [phone]

## 2019-09-18 NOTE — ROUTINE PROCESS
Bedside and Verbal shift change report given to Flor Watson RN (oncoming nurse) by Derek Dillard RN (offgoing nurse). Report included the following information SBAR, Kardex and Intake/Output.
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
## 2019-09-18 NOTE — PROGRESS NOTES
Problem: Falls - Risk of  Goal: *Absence of Falls  Description  Document Giselle Ortizs Fall Risk and appropriate interventions in the flowsheet.   Outcome: Progressing Towards Goal  Note:   Fall Risk Interventions:  Mobility Interventions: Communicate number of staff needed for ambulation/transfer         Medication Interventions: Bed/chair exit alarm, Patient to call before getting OOB    Elimination Interventions: Bed/chair exit alarm, Call light in reach              Problem: Patient Education: Go to Patient Education Activity  Goal: Patient/Family Education  Outcome: Progressing Towards Goal

## 2019-09-18 NOTE — PROGRESS NOTES
Patient have complaints and concerns of throbbing pain in left leg. Patient stated was a new onset left leg normally doesn't hurt. Patient has 3+ pitting edema BLE.

## 2019-09-18 NOTE — ROUTINE PROCESS
Bedside and Verbal shift change report given to Juany Evans RN (oncoming nurse) by Florinda Royal RN (offgoing nurse). Report included the following information SBAR, Kardex and MAR.

## 2019-09-19 LAB
ANION GAP SERPL CALC-SCNC: 4 MMOL/L (ref 3–18)
BASOPHILS # BLD: 0.1 K/UL (ref 0–0.1)
BASOPHILS NFR BLD: 1 % (ref 0–2)
BUN SERPL-MCNC: 42 MG/DL (ref 7–18)
BUN/CREAT SERPL: 32 (ref 12–20)
CALCIUM SERPL-MCNC: 9.5 MG/DL (ref 8.5–10.1)
CHLORIDE SERPL-SCNC: 101 MMOL/L (ref 100–111)
CO2 SERPL-SCNC: 28 MMOL/L (ref 21–32)
CREAT SERPL-MCNC: 1.31 MG/DL (ref 0.6–1.3)
DIFFERENTIAL METHOD BLD: ABNORMAL
EOSINOPHIL # BLD: 0.3 K/UL (ref 0–0.4)
EOSINOPHIL NFR BLD: 2 % (ref 0–5)
ERYTHROCYTE [DISTWIDTH] IN BLOOD BY AUTOMATED COUNT: 16.3 % (ref 11.6–14.5)
GLUCOSE SERPL-MCNC: 107 MG/DL (ref 74–99)
HCT VFR BLD AUTO: 27.8 % (ref 36–48)
HGB BLD-MCNC: 8.7 G/DL (ref 13–16)
LYMPHOCYTES # BLD: 2.6 K/UL (ref 0.9–3.6)
LYMPHOCYTES NFR BLD: 17 % (ref 21–52)
MCH RBC QN AUTO: 27.3 PG (ref 24–34)
MCHC RBC AUTO-ENTMCNC: 31.3 G/DL (ref 31–37)
MCV RBC AUTO: 87.1 FL (ref 74–97)
MONOCYTES # BLD: 1.3 K/UL (ref 0.05–1.2)
MONOCYTES NFR BLD: 9 % (ref 3–10)
NEUTS SEG # BLD: 11.1 K/UL (ref 1.8–8)
NEUTS SEG NFR BLD: 71 % (ref 40–73)
PLATELET # BLD AUTO: 531 K/UL (ref 135–420)
PMV BLD AUTO: 10 FL (ref 9.2–11.8)
POTASSIUM SERPL-SCNC: 5.4 MMOL/L (ref 3.5–5.5)
RBC # BLD AUTO: 3.19 M/UL (ref 4.7–5.5)
SODIUM SERPL-SCNC: 133 MMOL/L (ref 136–145)
WBC # BLD AUTO: 15.3 K/UL (ref 4.6–13.2)

## 2019-09-19 PROCEDURE — 85025 COMPLETE CBC W/AUTO DIFF WBC: CPT

## 2019-09-19 PROCEDURE — 36415 COLL VENOUS BLD VENIPUNCTURE: CPT

## 2019-09-19 PROCEDURE — 97530 THERAPEUTIC ACTIVITIES: CPT

## 2019-09-19 PROCEDURE — 97110 THERAPEUTIC EXERCISES: CPT

## 2019-09-19 PROCEDURE — 65660000000 HC RM CCU STEPDOWN

## 2019-09-19 PROCEDURE — 74011250637 HC RX REV CODE- 250/637: Performed by: NURSE PRACTITIONER

## 2019-09-19 PROCEDURE — 97535 SELF CARE MNGMENT TRAINING: CPT

## 2019-09-19 PROCEDURE — 74011250637 HC RX REV CODE- 250/637: Performed by: HOSPITALIST

## 2019-09-19 PROCEDURE — 74011250637 HC RX REV CODE- 250/637: Performed by: FAMILY MEDICINE

## 2019-09-19 PROCEDURE — 80048 BASIC METABOLIC PNL TOTAL CA: CPT

## 2019-09-19 RX ORDER — TRAMADOL HYDROCHLORIDE 50 MG/1
50 TABLET ORAL
Qty: 10 TAB | Refills: 0 | Status: SHIPPED | OUTPATIENT
Start: 2019-09-19 | End: 2019-09-19

## 2019-09-19 RX ORDER — OXYCODONE AND ACETAMINOPHEN 5; 325 MG/1; MG/1
1 TABLET ORAL
Qty: 10 TAB | Refills: 0 | Status: SHIPPED | OUTPATIENT
Start: 2019-09-19 | End: 2019-09-22

## 2019-09-19 RX ADMIN — OXYCODONE HYDROCHLORIDE AND ACETAMINOPHEN 1 TABLET: 5; 325 TABLET ORAL at 21:38

## 2019-09-19 RX ADMIN — APIXABAN 5 MG: 5 TABLET, FILM COATED ORAL at 18:18

## 2019-09-19 RX ADMIN — ACETAMINOPHEN 650 MG: 325 TABLET ORAL at 18:21

## 2019-09-19 RX ADMIN — APIXABAN 5 MG: 5 TABLET, FILM COATED ORAL at 08:35

## 2019-09-19 RX ADMIN — ALLOPURINOL 100 MG: 100 TABLET ORAL at 08:35

## 2019-09-19 RX ADMIN — OXYCODONE HYDROCHLORIDE AND ACETAMINOPHEN 1 TABLET: 5; 325 TABLET ORAL at 02:22

## 2019-09-19 RX ADMIN — OXYCODONE HYDROCHLORIDE AND ACETAMINOPHEN 1 TABLET: 5; 325 TABLET ORAL at 08:35

## 2019-09-19 RX ADMIN — ACETAMINOPHEN 650 MG: 325 TABLET ORAL at 11:48

## 2019-09-19 RX ADMIN — OXYCODONE HYDROCHLORIDE AND ACETAMINOPHEN 1 TABLET: 5; 325 TABLET ORAL at 15:38

## 2019-09-19 RX ADMIN — PANTOPRAZOLE SODIUM 40 MG: 40 TABLET, DELAYED RELEASE ORAL at 08:35

## 2019-09-19 NOTE — PROGRESS NOTES
Thank you for your referral for a standard wheelchair- will deliver wheelchair to patients room today.     [address] Liaison  [facility]
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
## 2019-09-19 NOTE — PROGRESS NOTES
Home Health orders sent to [facility] and updated in AVS.   Eddie Mansfield RN BSN  Case Management [phone]

## 2019-09-19 NOTE — DISCHARGE INSTRUCTIONS
Discharge Instructions    Patient: Maria Arnold MRN: [id number]  CSN: [id number]    YOB: 1955  Age: 60 y.o. Sex: male    DOA: 9/7/2019 LOS:  LOS: 9 days   Discharge Date:          ACTIVITY: Activity as tolerated  Home health care for Skilled care for CHF, DM, Hypertension and medication management    ·    PT/OT consult  ·             Speech consult  ·  Wound care consult  ·  Accuchecks  QAC and QHS  ·  Lopez Care per routine  ·  PICC line care per routine    ADDITIONAL INFORMATION: If you experience any of the following symptoms but not limited to Fever, chills, nausea, vomiting, diarrhea, change in mentation, falling, bleeding, shortness of breath, chest pain, please call your primary care physician or return to the emergency room if you cannot get hold of your doctor:     FOLLOW UP CARE:  PCP (needs new) in 5-7 days Dr. Jaylen Cristina in 3-4 weeks Dr. Bro Levin in 4 weeks Dr. Eric Mcfarlane (GI) in 3-4 weeks        Benigno Madden NP  9/16/2019 1:05 PM                               Learning About Sepsis  What is sepsis? Sepsis is an intense reaction to an infection. It can cause deadly damage to the body and lead to dangerously low blood pressure. You may have inflammation across large areas of your body. It can damage tissue and even go deep into your organs. Sepsis can develop very quickly. It requires immediate care in a hospital.  Infections that can lead to sepsis include:  · A skin infection such as from a cut. · A lung infection like pneumonia. · A kidney infection. · A gut infection such as E. coli. Symptoms can include low blood pressure, breathing problems, fast heartbeat, and confusion. Other symptoms include fever or low body temperature, chills, cool clammy skin, skin rashes, and shaking. Sepsis can cause problems in many organs. People with sepsis might need to be treated in an intensive care unit (ICU) for several days or weeks.  An ICU is a part of the hospital where very sick people get care. Septic shock is sepsis that causes extremely low blood pressure, which limits blood flow to the body. It can cause organ failure and death. How is sepsis treated? Doctors will treat the person with antibiotics. They will try to find the infection that led to sepsis. Machines will track the person's vital signs, including temperature, blood pressure, breathing rate, and pulse rate. The person will get fluids through an IV. He or she may also get strong medicine. This can help raise the blood pressure. If a person with sepsis is very sick, equipment in the ICU can support many body systems. That includes breathing, circulation, fluids, and help for organs like the kidneys and heart. If the person needs help breathing, a ventilator may be used. What can you expect when someone has sepsis? The person may start new treatments while still in the hospital. Different doctors may help with different symptoms. If a person needs to be treated in the intensive care unit (ICU), the ICU staff will do everything they can to treat all of the problems sepsis causes, including the infection. The ICU can be scary and confusing for patients and their families, friends, and supporters. But it's designed to keep your loved one comfortable and safe and to provide the best medical care. Expect a long recovery after the person leaves the ICU. If you need it, ask for support from friends and family. Where can you learn more? Go to http://bebeto-florencia.info/. Enter P445 in the search box to learn more about \"Learning About Sepsis. \"  Current as of: September 23, 2018  Content Version: 12.1  © 1309-8188 [employer]. Care instructions adapted under license by [employer] (which disclaims liability or warranty for this information).  If you have questions about a medical condition or this instruction, always ask your healthcare professional. [address] any warranty or liability for your use of this information. Gout: Care Instructions  Your Care Instructions    Gout is a form of arthritis caused by a buildup of uric acid crystals in a joint. It causes sudden attacks of pain, swelling, redness, and stiffness, usually in one joint, especially the big toe. Gout usually comes on without a cause. But it can be brought on by drinking alcohol (especially beer) or eating seafood and red meat. Taking certain medicines, such as diuretics or aspirin, also can bring on an attack of gout. Taking your medicines as prescribed and following up with your doctor regularly can help you avoid gout attacks in the future. Follow-up care is a key part of your treatment and safety. Be sure to make and go to all appointments, and call your doctor if you are having problems. It's also a good idea to know your test results and keep a list of the medicines you take. How can you care for yourself at home? · If the joint is swollen, put ice or a cold pack on the area for 10 to 20 minutes at a time. Put a thin cloth between the ice and your skin. · Prop up the sore limb on a pillow when you ice it or anytime you sit or lie down during the next 3 days. Try to keep it above the level of your heart. This will help reduce swelling. · Rest sore joints. Avoid activities that put weight or strain on the joints for a few days. Take short rest breaks from your regular activities during the day. · Take your medicines exactly as prescribed. Call your doctor if you think you are having a problem with your medicine. · Take pain medicines exactly as directed. ? If the doctor gave you a prescription medicine for pain, take it as prescribed. ? If you are not taking a prescription pain medicine, ask your doctor if you can take an over-the-counter medicine. · Eat less seafood and red meat. · Check with your doctor before drinking alcohol.   · Losing weight, if you are overweight, may help reduce attacks of gout. But do not go on a [employer]. \" Losing a lot of weight in a short amount of time can cause a gout attack. When should you call for help? Call your doctor now or seek immediate medical care if:    · You have a fever.     · The joint is so painful you cannot use it.     · You have sudden, unexplained swelling, redness, warmth, or severe pain in one or more joints.    Watch closely for changes in your health, and be sure to contact your doctor if:    · You have joint pain.     · Your symptoms get worse or are not improving after 2 or 3 days. Where can you learn more? Go to http://bebeto-florencia.info/. Enter S931 in the search box to learn more about \"Gout: Care Instructions. \"  Current as of: April 1, 2019  Content Version: 12.1  © 0946-9702 "[employer]. Care instructions adapted under license by [employer] (which disclaims liability or warranty for this information). If you have questions about a medical condition or this instruction, always ask your healthcare professional. [address] any warranty or liability for your use of this information. Deep Vein Thrombosis: Care Instructions  Your Care Instructions    A deep vein thrombosis (DVT) is a blood clot in certain veins of the legs, pelvis, or arms. Blood clots in these veins need to be treated because they can get bigger, break loose, and travel through the bloodstream to the lungs. A blood clot in a lung can be life-threatening. The doctor may have given you a blood thinner (anticoagulant). A blood thinner can stop the blood clot from growing larger and prevent new clots from forming. You will need to take a blood thinner for 3 to 6 months or longer. The doctor has checked you carefully, but problems can develop later. If you notice any problems or new symptoms, get medical treatment right away. Follow-up care is a key part of your treatment and safety.  Be sure to make and go to all appointments, and call your doctor if you are having problems. It's also a good idea to know your test results and keep a list of the medicines you take. How can you care for yourself at home? · Take your medicines exactly as prescribed. Call your doctor if you think you are having a problem with your medicine. · If you are taking a blood thinner, be sure you get instructions about how to take your medicine safely. Blood thinners can cause serious bleeding problems. · Wear compression stockings if your doctor recommends them. These stockings are tighter at the feet than on the legs. They may reduce pain and swelling in your legs. But there are different types of stockings, and they need to fit right. So your doctor will recommend what you need. · When you sit, use a pillow to raise the arm or leg that has the blood clot. Try to keep it above the level of your heart. When should you call for help? Call 911 anytime you think you may need emergency care. For example, call if:    · You passed out (lost consciousness).     · You have symptoms of a blood clot in your lung (called a pulmonary embolism). These include:  ? Sudden chest pain. ? Trouble breathing. ? Coughing up blood.    Call your doctor now or seek immediate medical care if:    · You have new or worse trouble breathing.     · You are dizzy or lightheaded, or you feel like you may faint.     · You have symptoms of a blood clot in your arm or leg. These may include:  ? Pain in the arm, calf, back of the knee, thigh, or groin. ? Redness and swelling in the arm, leg, or groin.    Watch closely for changes in your health, and be sure to contact your doctor if:    · You do not get better as expected. Where can you learn more? Go to http://bebeto-florencia.info/. Enter Y105 in the search box to learn more about \"Deep Vein Thrombosis: Care Instructions. \"  Current as of: September 26, 2018  Content Version: 12.1  © 2006-2019 [employer]. Care instructions adapted under license by "[employer] (which disclaims liability or warranty for this information). If you have questions about a medical condition or this instruction, always ask your healthcare professional. [address] any warranty or liability for your use of this information. Allopurinol (By mouth)   Allopurinol (al-oh-PURE-i-nol)  Treats gout and kidney stones. Lowers the amount of uric acid in the blood. Brand Name(s): Zyloprim   There may be other brand names for this medicine. When This Medicine Should Not Be Used: This medicine is not right for everyone. Do not use it if you had an allergic reaction to allopurinol. How to Use This Medicine:   Tablet, Capsule  · Your doctor will tell you how much medicine to use. Do not use more than directed. · Keep taking this medicine, even if you think it is not working and you are taking other medicines for gout attacks. The attacks should become shorter and less severe after you take allopurinol for several months. · Drink 10 to 12 full glasses of water each day unless directed differently by your doctor. · You may take allopurinol after meals to prevent stomach upset. · Missed dose: Take a dose as soon as you remember. If it is almost time for your next dose, wait until then and take a regular dose. Do not take extra medicine to make up for a missed dose. · Store the medicine in a closed container at room temperature, away from heat, moisture, and direct light. Drugs and Foods to Avoid:   Ask your doctor or pharmacist before using any other medicine, including over-the-counter medicines, vitamins, and herbal products. · Some medicines and foods can affect how allopurinol works.  Tell your doctor if you are taking any of the following:   ¨ A blood thinner, such as warfarin or dicumarol  ¨ A diuretic (water pill)  ¨ Ampicillin, amoxicillin, cyclosporine  ¨ Mercaptopurine, azathioprine  ¨ Sulfinpyrazone  · Do not take large doses of vitamin C while you are taking allopurinol. Warnings While Using This Medicine:   · Tell your doctor if you are pregnant or breastfeeding, or you have diabetes, high blood pressure, heart failure, seizures, kidney disease, liver disease, cancer, or other medical problems. · This medicine may make you drowsy. Do not drive or do anything that could be dangerous until you know how this medicine affects you. · Keep all medicine out of the reach of children. Never share your medicine with anyone. Possible Side Effects While Using This Medicine:   Call your doctor right away if you notice any of these side effects:  · Allergic reaction: Itching or hives, swelling in your face or hands, swelling or tingling in your mouth or throat, chest tightness, trouble breathing  · Blistering, peeling, red skin rash  · Eye irritation  · Joint pain or muscle aches  · Pain when you urinate, blood in your urine  · Yellow skin or eyes  If you notice these less serious side effects, talk with your doctor:   · Drowsiness  · Nausea, vomiting, or diarrhea  If you notice other side effects that you think are caused by this medicine, tell your doctor. Call your doctor for medical advice about side effects. You may report side effects to FDA at 2-655-FDA-7862  © 2017 [facility] Information is for End User's use only and may not be sold, redistributed or otherwise used for commercial purposes. The above information is an  only. It is not intended as medical advice for individual conditions or treatments. Talk to your doctor, nurse or pharmacist before following any medical regimen to see if it is safe and effective for you. Oxycodone/Acetaminophen (By mouth)   Acetaminophen (o-oeon-k-MIN-oh-fen), Oxycodone Hydrochloride (iv-f-JUV-done kendy-droe-KLOR-moise)  Treats moderate to moderately severe pain.  This medicine is a narcotic pain reliever. Brand Name(s): Endocet, Percocet, Primlev, Xartemis XR   There may be other brand names for this medicine. When This Medicine Should Not Be Used: This medicine is not right for everyone. Do not use it if you had an allergic reaction to acetaminophen or oxycodone, or if you have serious breathing problems or paralytic ileus. How to Use This Medicine:   Capsule, Liquid, Tablet, Long Acting Tablet  Your doctor will tell you how much medicine to use. Do not use more than directed. An overdose can be dangerous. Follow directions carefully so you do not get too much medicine at one time. Oral liquid: Measure the oral liquid medicine with a marked measuring spoon, oral syringe, or medicine cup. Swallow the extended-release tablet whole. Do not crush, break, or chew it. Do not lick or wet the tablet before placing it in your mouth. Do not give this medicine through a feeding tube. This medicine should come with a Medication Guide. Ask your pharmacist for a copy if you do not have one. Missed dose: If you miss a dose of this medicine, skip the missed dose and go back to your regular dosing schedule. Do not double doses. Store the medicine in a closed container at room temperature, away from heat, moisture, and direct light. Ask your pharmacist about the best way to dispose of medicine you do not use. Drugs and Foods to Avoid:   Ask your doctor or pharmacist before using any other medicine, including over-the-counter medicines, vitamins, and herbal products. Do not use Xartemis XR if you are using or have used an MAO inhibitor in the past 14 days. Some medicines can affect how this medicine works.  Tell your doctor if you are using any of the following:   Carbamazepine, erythromycin, ketoconazole, lamotrigine, mirtazapine, naltrexone, phenytoin, propranolol, rifampin, ritonavir, tramadol, trazodone, or zidovudine  Birth control pills  Diuretic (water pill)  Medicine to treat depression  Phenothiazine medicine  Triptan medicine to treat migraine headaches  Do not drink alcohol while you are using this medicine. Acetaminophen can damage your liver, and alcohol can increase this risk. Do not take acetaminophen without asking your doctor if you have 3 or more drinks of alcohol every day. Tell your doctor if you use anything else that makes you sleepy. Some examples are allergy medicine, narcotic pain medicine, and alcohol. Tell your doctor if you are using buprenorphine, butorphanol, nalbuphine, pentazocine, a benzodiazepine, or a muscle relaxer. Warnings While Using This Medicine:   Tell your doctor if you are pregnant or breastfeeding, or if you have kidney disease, liver disease, heart disease, low blood pressure, breathing problems or lung disease (such as asthma, COPD), thyroid problems, [place] disease, pancreas or gallbladder problems, prostate problems, trouble urinating, or a stomach problems, or a history of head injury or brain damage, seizures, or alcohol or drug abuse. Tell your doctor if you are allergic to codeine. This medicine may cause the following problems:  High risk of overdose, which can lead to death  Respiratory depression (serious breathing problem that can be life-threatening)  Liver problems  Serious skin reactions  Serotonin syndrome (when used with certain medicines)  This medicine may make you dizzy or drowsy. Do not drive or do anything that could be dangerous until you know how this medicine affects you. Sit or lie down if you feel dizzy. Stand up carefully. This medicine contains acetaminophen. Read the labels of all other medicines you are using to see if they also contain acetaminophen, or ask your doctor or pharmacist. Rebeka Mimsmin not use more than 4 grams (4,000 milligrams) total of acetaminophen in one day. This medicine can be habit-forming. Do not use more than your prescribed dose. Call your doctor if you think your medicine is not working.   Do not stop using this medicine suddenly. Your doctor will need to slowly decrease your dose before you stop it completely. This medicine could cause infertility. Talk with your doctor before using this medicine if you plan to have children. This medicine may cause constipation, especially with long-term use. Ask your doctor if you should use a laxative to prevent and treat constipation. Keep all medicine out of the reach of children. Never share your medicine with anyone. Possible Side Effects While Using This Medicine:   Call your doctor right away if you notice any of these side effects: Allergic reaction: Itching or hives, swelling in your face or hands, swelling or tingling in your mouth or throat, chest tightness, trouble breathing  Anxiety, restlessness, fast heartbeat, fever, muscle spasms, twitching, diarrhea, seeing or hearing things that are not there  Blistering, peeling, red skin rash  Blue lips, fingernails, or skin  Dark urine or pale stools, loss of appetite, stomach pain, yellow skin or eyes  Extreme weakness, shallow breathing, uneven heartbeat, seizures, sweating, or cold or clammy skin  Severe confusion, lightheadedness, dizziness, or fainting  Severe constipation, nausea, or vomiting  Trouble breathing or slow breathing  If you notice these less serious side effects, talk with your doctor:   Headache  Mild constipation, nausea, or vomiting  Mild sleepiness or drowsiness  If you notice other side effects that you think are caused by this medicine, tell your doctor. Call your doctor for medical advice about side effects. You may report side effects to FDA at 1-939-KVP-3893  © 2017 [facility] Information is for End User's use only and may not be sold, redistributed or otherwise used for commercial purposes. The above information is an  only. It is not intended as medical advice for individual conditions or treatments.  Talk to your doctor, nurse or pharmacist before following any medical regimen to see if it is safe and effective for you. Pantoprazole (By mouth)   Pantoprazole (pan-TOE-pra-zole)  Treats gastroesophageal reflux disease (GERD), a damaged esophagus, and high levels of stomach acid. This medicine is a proton pump inhibitor (PPI). Brand Name(s): Protonix   There may be other brand names for this medicine. When This Medicine Should Not Be Used: This medicine is not right for everyone. Do not use it if you had an allergic reaction to pantoprazole or similar medicines. How to Use This Medicine:   Packet, Tablet, Delayed Release Tablet, Long Acting Tablet  · Your doctor will tell you how much medicine to use. Do not use more than directed. Take the medicine at least 30 minutes before a meal.  · Delayed-release tablet: Swallow the tablet whole. Do not crush, break, or chew it. · Delayed-release packet:   ¨ To prepare with applesauce:   § Mix the packet contents with 1 teaspoon of applesauce. Do not mix with water, or other liquids or food. Do not divide the packet contents to make smaller doses. § Swallow the mixture within 10 minutes after you mix it. Do not chew or crush the granules. § Sip some water after you take the mixture to make sure you swallow all of the medicine. ¨ To prepare with apple juice:   § Mix the packet contents with 1 teaspoon of apple juice in a small cup. Do not divide the packet contents to make smaller doses. § Stir for 5 seconds and drink the mixture immediately. Do not chew or crush the granules. § To make sure you get all of the medicine, add more apple juice to the cup. Drink it immediately. ¨ To prepare for a feeding tube:   § Pour the packet contents in a 2-ounce (60 milliliter [mL]) catheter-tip syringe. § Add 10 mL of apple juice to the syringe. Add the mixture to the tube. Gently tap or shake the barrel of the syringe to help empty it. § Add 10 mL of apple juice to the syringe and put it in the tube.  Do this at least 2 times. There should be no granules left in the syringe. · This medicine should come with a Medication Guide. Ask your pharmacist for a copy if you do not have one. · Missed dose: Take a dose as soon as you remember. If it is almost time for your next dose, wait until then and take a regular dose. Do not take extra medicine to make up for a missed dose. · Store the medicine in a closed container at room temperature, away from heat, moisture, and direct light. Drugs and Foods to Avoid:   Ask your doctor or pharmacist before using any other medicine, including over-the-counter medicines, vitamins, and herbal products. · Some foods and medicines can affect how pantoprazole works. Tell your doctor if you are using any of the following:   ¨ Ampicillin, atazanavir, digoxin, erlotinib, ketoconazole, methotrexate, mycophenolate mofetil, nelfinavir  ¨ Blood thinner (including warfarin)  ¨ Diuretic (water pill)  ¨ Iron supplements  Warnings While Using This Medicine:   · Tell your doctor if you are pregnant or breastfeeding, or if you have liver disease, lupus, or osteoporosis. · This medicine may cause the following problems:  ¨ Kidney problems  ¨ Low vitamin B12 or magnesium levels  ¨ Increased risk of broken bones in the hip, wrist, or spine  · This medicine can cause diarrhea. Call your doctor if the diarrhea becomes severe, does not stop, or is bloody. Do not take any medicine to stop diarrhea until you have talked to your doctor. Diarrhea can occur 2 months or more after you stop taking this medicine. · Tell any doctor or dentist who treats you that you are using this medicine. This medicine may affect certain medical test results. · Your doctor will check your progress and the effects of this medicine at regular visits. Keep all appointments. · Keep all medicine out of the reach of children. Never share your medicine with anyone.   Possible Side Effects While Using This Medicine:   Call your doctor right away if you notice any of these side effects:  · Allergic reaction: Itching or hives, swelling in your face or hands, swelling or tingling in your mouth or throat, chest tightness, trouble breathing  · Blistering, peeling, red skin rash  · Fever, joint pain, swelling in your body, unusual weight gain, change in how much or how often you urinate  · Joint pain, rash on your cheeks or arms that gets worse in the sun  · Seizures, dizziness, uneven heartbeat, muscle cramps or twitching  · Severe diarrhea, stomach cramps, fever  · Stomach pain, nausea, vomiting, weight loss  If you notice other side effects that you think are caused by this medicine, tell your doctor. Call your doctor for medical advice about side effects. You may report side effects to FDA at 4-200-FDA-7231  © 2017 [employer] Information is for End User's use only and may not be sold, redistributed or otherwise used for commercial purposes. The above information is an  only. It is not intended as medical advice for individual conditions or treatments. Talk to your doctor, nurse or pharmacist before following any medical regimen to see if it is safe and effective for you. Polyethylene Glycol 3350 (MiraLAX, Healthylax Polyethylene Glycol 3350, GaviLAX, Leader ClearLax) - (By mouth)   Why this medicine is used:   Treats occasional constipation. Contact a nurse or doctor right away if you have:  · Severe stomach pain, bloating, vomiting, diarrhea     Common side effects:  · Mild cramps, bloating, diarrhea, gas  © 2017 [employer] Information is for End User's use only and may not be sold, redistributed or otherwise used for commercial purposes. Acetaminophen/Codeine (Tylenol with Codeine No. 3, Tylenol w/Codeine #4, Tylenol With Codeine No. 4, Tylenol w/Codeine #3) - (By mouth)   Why this medicine is used:   Treats mild to moderately severe pain.  This medicine contains a narcotic pain reliever. Contact a nurse or doctor right away if you have:  · Anxiety, restlessness, fast heartbeat, fever, sweating, muscle spasms, twitching, diarrhea, seeing or hearing things that are not there  · Extreme weakness, shallow breathing, slow heartbeat  · Extreme drowsiness or confusion  · Sweating or cold, clammy skin  · Dark urine or pale stools, nausea, vomiting, loss of appetite, stomach pain  · Yellow skin or eyes     Common side effects:  · Constipation  © 2017 [address] Information is for End User's use only and may not be sold, redistributed or otherwise used for commercial purposes.

## 2019-09-19 NOTE — PROGRESS NOTES
PHYSICAL THERAPY TREATMENT    Patient: Tanya Costello (50 y.o. male)  Date: 9/19/2019  Diagnosis: Sepsis ([facility] [A41.9] <principal problem not specified>  Procedure(s) (LRB):  ENDOSCOPY (N/A) 8 Days Post-Op  Precautions: Fall   Chart, physical therapy assessment, plan of care and goals were reviewed. OBJECTIVE/ ASSESSMENT:  Patient found supine in bed willing to work with PT. Patient seen with OT to maximize safety of patient and staff. Pt able to progress toward goals this tx, as evidenced by decreased assistance needed for bed mobility. Pt sat at EOB this tx and able to perform LAQ through limited range x 4 reps bilaterally before becoming too fatigued. Pt tends to hold breath with exercise and transitional movements, requiring several VCs to breathe. Pt attempted scooting at EOB and requires maxA from draw sheet underneath him. Pt provided with b/s chair to attempt pushing up with forward lean for scooting, but no major improvement noted. Pt returned to supine and scooted himself up toward [facility] with left UE. Pt's right hand edema has decreased but still present. Pt's bilateral LE are edematous, right > left. Pt attribute pain to gout and is currently on Allopurinol. Progression toward goals:  ?      Improving appropriately and progressing toward goals  ? Improving slowly and progressing toward goals  ? Not making progress toward goals and plan of care will be adjusted     PLAN:  Patient continues to benefit from skilled intervention to address the above impairments. Continue treatment per established plan of care. Discharge Recommendations:  Levar Smith  Further Equipment Recommendations for Discharge:  rolling walker     SUBJECTIVE:   Patient stated I'm going to try to sit on the edge on my own.  Pt asking if he can sit at EOB, Pt encouraged to do so, but not to stand without assistance.     OBJECTIVE DATA SUMMARY:   Critical Behavior:  Neurologic State: Alert  Orientation Level: Oriented X4  Cognition: Appropriate decision making, Follows commands  Safety/Judgement: Awareness of environment, Insight into deficits  Functional Mobility Training:  Bed Mobility:  Supine to Sit: Moderate assistance;Assist x2  Sit to Supine: Minimum assistance;Assist x2  Scooting: Stand-by assistance  Balance:  Sitting - Static: Good (unsupported)  Sitting - Dynamic: Fair (occasional)    Therapeutic Exercises:       EXERCISE   Sets   Reps   Active Active Assist   Passive Self- assited ROM   Comments   Ankle Pumps   ? ? ? ?    Quad Sets   ? ? ? ? Glut Sets   ? ? ? ? Short Arc Quads   ? ? ? ? Heel Slides   ? ? ? ? Straight Leg Raises   ? ? ? ? Hip Abd / adduction 1 10 ? ? ? ? Bilaterally  Through limited range, pt performing on own without cues to do so. Long Arc Quads 1 4 ? ? ? ? Bilaterally   Seated Marching   ? ? ? ? Seated Knee Flexion   ? ? ? ? Standing Marching   ? ? ? ? Pain:  Pain level pre-treatment: 9  Pain level post-treatment: 9    Activity Tolerance:   Fair  Please refer to the flowsheet for vital signs taken during this treatment. After treatment:   ? Patient left in no apparent distress sitting up in chair  ? Patient left in no apparent distress in bed  ? Call bell left within reach  ? Nursing notified  ? Caregiver present  ? Bed alarm activated  ? SCDs applied    COMMUNICATION/EDUCATION:   ?         Role of Physical Therapy  ? Fall prevention  ? Bed mobility  ? Transfers  ? Ambulation / gait  ? Assistive device management  ? Stairs  ? Body mechanics  ? Position change   ? Therapeutic exercise  ? Activity pacing / energy conservation  ?          Other:      Payal Pham PTA   Time Calculation: 38 mins

## 2019-09-19 NOTE — HOME CARE
Received referral for [facility] for SN, PT, and OT. Patient reports he does not know the address for where he will discharge. Patient reports the address (group home) on file has bed bugs and he has no intentions on returning there. Patient advised to contact the office with the address within 5 days to process the referral. Patient informed if he does not contact the office within 5 days, the referral will be canceled. Patient provided contact number to liaisons office. He verbalized understanding. , [address] [place] [address] notified.      Jase Mak LPN   [facility] Liaison  [phone]

## 2019-09-19 NOTE — PROGRESS NOTES
Problem: Falls - Risk of  Goal: *Absence of Falls  Description  Document Kayleen Degroot Fall Risk and appropriate interventions in the flowsheet.   Outcome: Progressing Towards Goal  Note:   Fall Risk Interventions:  Mobility Interventions: Bed/chair exit alarm, Utilize walker, cane, or other assistive device, Strengthening exercises (ROM-active/passive)         Medication Interventions: Bed/chair exit alarm, Patient to call before getting OOB    Elimination Interventions: Bed/chair exit alarm              Problem: Patient Education: Go to Patient Education Activity  Goal: Patient/Family Education  Outcome: Progressing Towards Goal

## 2019-09-19 NOTE — PROGRESS NOTES
Informed by Cly[place] at [facility] that they are unable to accept patient due to patient's status as a registered sex offender. Informed patient of above and inability to place patient in a skilled nursing facility due to this fact. Informed patient that he will need to go back to the group home or make other arrangements. Patient states he does not want to return to the group home due to the conditions at the house. Patient called friend, Melina Dandy, who can pick him up tomorrow. Informed patient he had been discharged. Requesting wheelchair for safety and mobility. Patient to call Melina Dandy back and see if he can pick him up sooner.     [facility]  Care Management  [phone]

## 2019-09-19 NOTE — PROGRESS NOTES
Spoke with patient. Galo Alert, will  patient 9/20 at 8 am. Patient informed once his dme, wheelchair, arrives he must be discharged home. Patient verbalized understanding. Waiting on DME and [place] orders.   Noris Alvarez RN BSN  Case Management [phone]

## 2019-09-19 NOTE — ROUTINE PROCESS
Bedside shift change report given to AARON Camargo (oncoming nurse) by Beatriz Zacarias RN (offgoing nurse). Report included the following information SBAR, Kardex and MAR.
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
## 2019-09-20 ENCOUNTER — HOME HEALTH ADMISSION (OUTPATIENT)
Dept: HOME HEALTH SERVICES | Facility: HOME HEALTH | Age: 64
End: 2019-09-20
Payer: MEDICAID

## 2019-09-20 VITALS
RESPIRATION RATE: 18 BRPM | WEIGHT: 282.8 LBS | TEMPERATURE: 98.3 F | HEART RATE: 104 BPM | SYSTOLIC BLOOD PRESSURE: 116 MMHG | BODY MASS INDEX: 36.29 KG/M2 | OXYGEN SATURATION: 97 % | HEIGHT: 74 IN | DIASTOLIC BLOOD PRESSURE: 68 MMHG

## 2019-09-20 PROCEDURE — 74011250637 HC RX REV CODE- 250/637: Performed by: FAMILY MEDICINE

## 2019-09-20 PROCEDURE — 74011250637 HC RX REV CODE- 250/637: Performed by: HOSPITALIST

## 2019-09-20 PROCEDURE — 77030011256 HC DRSG MEPILEX <16IN NO BORD MOLN -A

## 2019-09-20 PROCEDURE — 74011250637 HC RX REV CODE- 250/637: Performed by: NURSE PRACTITIONER

## 2019-09-20 RX ADMIN — PANTOPRAZOLE SODIUM 40 MG: 40 TABLET, DELAYED RELEASE ORAL at 09:08

## 2019-09-20 RX ADMIN — ALLOPURINOL 100 MG: 100 TABLET ORAL at 09:10

## 2019-09-20 RX ADMIN — OXYCODONE HYDROCHLORIDE AND ACETAMINOPHEN 1 TABLET: 5; 325 TABLET ORAL at 05:44

## 2019-09-20 RX ADMIN — ACETAMINOPHEN 650 MG: 325 TABLET ORAL at 09:08

## 2019-09-20 RX ADMIN — POLYETHYLENE GLYCOL 3350 17 G: 17 POWDER, FOR SOLUTION ORAL at 09:08

## 2019-09-20 RX ADMIN — OXYCODONE HYDROCHLORIDE AND ACETAMINOPHEN 1 TABLET: 5; 325 TABLET ORAL at 11:03

## 2019-09-20 RX ADMIN — APIXABAN 5 MG: 5 TABLET, FILM COATED ORAL at 09:08

## 2019-09-20 NOTE — PROGRESS NOTES
Patient has wheelchair at bedside. Medications sent to outpatient pharmacy to be filled. Patient stated his friend Venkatesh Guerrero will be coming to pick him up. [place], Pamela Chisholm, to set up primary care physician appointment. Home Health to see patient once he has provided them with an address. Updated RN.   Derrick Ellis RN BSN  Case Management [phone]

## 2019-09-20 NOTE — HOME CARE
Discharge noted for today. Patient was not able to provide an address and directed me to get the address from his friend Melina Dandy. Address provided by Melina Dandy is [address], [place] [facility] . Address is to [employer]. Patient stated he had no idea where he was going and his living arrangements had been set up by Melina Dandy. [facility] to follow for SN, PT, and OT.      Rosa Bridges LPN   [facility] Liaison  [phone]

## 2019-09-20 NOTE — PROGRESS NOTES
Pt assisted into W/C, it took him approx 20 min to get OOB and pivot to chair, c/o pain to legs. He had a percocet one hour ago with reported acceptable relief. Now he is in w/c with his meds, paperwork signed and signed advanced directives. He is escorted to lobby by unit manager for discharge.

## 2019-09-22 ENCOUNTER — HOME CARE VISIT (OUTPATIENT)
Dept: HOME HEALTH SERVICES | Facility: HOME HEALTH | Age: 64
End: 2019-09-22
Payer: MEDICAID

## 2019-09-23 ENCOUNTER — HOME CARE VISIT (OUTPATIENT)
Dept: HOME HEALTH SERVICES | Facility: HOME HEALTH | Age: 64
End: 2019-09-23
Payer: MEDICAID

## 2019-09-24 ENCOUNTER — HOME CARE VISIT (OUTPATIENT)
Dept: SCHEDULING | Facility: HOME HEALTH | Age: 64
End: 2019-09-24
Payer: MEDICAID

## 2019-09-24 PROCEDURE — 400013 HH SOC

## 2019-09-24 PROCEDURE — G0299 HHS/HOSPICE OF RN EA 15 MIN: HCPCS

## 2019-09-25 ENCOUNTER — HOME CARE VISIT (OUTPATIENT)
Dept: SCHEDULING | Facility: HOME HEALTH | Age: 64
End: 2019-09-25
Payer: MEDICAID

## 2019-09-25 ENCOUNTER — HOME CARE VISIT (OUTPATIENT)
Dept: HOME HEALTH SERVICES | Facility: HOME HEALTH | Age: 64
End: 2019-09-25
Payer: MEDICAID

## 2019-09-25 VITALS
OXYGEN SATURATION: 98 % | TEMPERATURE: 97.9 F | HEART RATE: 103 BPM | DIASTOLIC BLOOD PRESSURE: 80 MMHG | SYSTOLIC BLOOD PRESSURE: 118 MMHG

## 2019-09-25 VITALS
WEIGHT: 315 LBS | RESPIRATION RATE: 16 BRPM | DIASTOLIC BLOOD PRESSURE: 62 MMHG | OXYGEN SATURATION: 96 % | HEIGHT: 74 IN | TEMPERATURE: 98.8 F | HEART RATE: 102 BPM | SYSTOLIC BLOOD PRESSURE: 94 MMHG | BODY MASS INDEX: 40.43 KG/M2

## 2019-09-25 PROCEDURE — G0151 HHCP-SERV OF PT,EA 15 MIN: HCPCS

## 2019-09-25 PROCEDURE — G0152 HHCP-SERV OF OT,EA 15 MIN: HCPCS

## 2019-09-26 ENCOUNTER — HOME CARE VISIT (OUTPATIENT)
Dept: SCHEDULING | Facility: HOME HEALTH | Age: 64
End: 2019-09-26
Payer: MEDICAID

## 2019-09-26 VITALS
HEART RATE: 103 BPM | TEMPERATURE: 97.9 F | OXYGEN SATURATION: 98 % | DIASTOLIC BLOOD PRESSURE: 80 MMHG | SYSTOLIC BLOOD PRESSURE: 118 MMHG

## 2019-09-26 PROCEDURE — G0155 HHCP-SVS OF CSW,EA 15 MIN: HCPCS

## 2019-09-27 ENCOUNTER — HOME CARE VISIT (OUTPATIENT)
Dept: SCHEDULING | Facility: HOME HEALTH | Age: 64
End: 2019-09-27
Payer: MEDICAID

## 2019-09-27 PROCEDURE — G0152 HHCP-SERV OF OT,EA 15 MIN: HCPCS

## 2019-09-27 PROCEDURE — G0157 HHC PT ASSISTANT EA 15: HCPCS

## 2019-09-30 ENCOUNTER — HOME CARE VISIT (OUTPATIENT)
Dept: SCHEDULING | Facility: HOME HEALTH | Age: 64
End: 2019-09-30
Payer: MEDICAID

## 2019-09-30 VITALS
DIASTOLIC BLOOD PRESSURE: 60 MMHG | HEART RATE: 115 BPM | SYSTOLIC BLOOD PRESSURE: 98 MMHG | OXYGEN SATURATION: 96 % | TEMPERATURE: 98.4 F

## 2019-09-30 PROCEDURE — G0299 HHS/HOSPICE OF RN EA 15 MIN: HCPCS

## 2019-09-30 PROCEDURE — G0152 HHCP-SERV OF OT,EA 15 MIN: HCPCS

## 2019-10-01 ENCOUNTER — HOME CARE VISIT (OUTPATIENT)
Dept: SCHEDULING | Facility: HOME HEALTH | Age: 64
End: 2019-10-01
Payer: MEDICAID

## 2019-10-01 VITALS
TEMPERATURE: 98.4 F | OXYGEN SATURATION: 99 % | DIASTOLIC BLOOD PRESSURE: 80 MMHG | HEART RATE: 110 BPM | SYSTOLIC BLOOD PRESSURE: 105 MMHG

## 2019-10-01 VITALS
SYSTOLIC BLOOD PRESSURE: 106 MMHG | TEMPERATURE: 98.4 F | OXYGEN SATURATION: 99 % | HEART RATE: 110 BPM | RESPIRATION RATE: 16 BRPM | DIASTOLIC BLOOD PRESSURE: 80 MMHG

## 2019-10-01 PROCEDURE — G0157 HHC PT ASSISTANT EA 15: HCPCS

## 2019-10-02 ENCOUNTER — HOME CARE VISIT (OUTPATIENT)
Dept: SCHEDULING | Facility: HOME HEALTH | Age: 64
End: 2019-10-02
Payer: MEDICAID

## 2019-10-02 PROCEDURE — G0152 HHCP-SERV OF OT,EA 15 MIN: HCPCS

## 2019-10-03 ENCOUNTER — HOME CARE VISIT (OUTPATIENT)
Dept: SCHEDULING | Facility: HOME HEALTH | Age: 64
End: 2019-10-03
Payer: MEDICAID

## 2019-10-03 PROCEDURE — G0157 HHC PT ASSISTANT EA 15: HCPCS

## 2019-10-04 ENCOUNTER — HOME CARE VISIT (OUTPATIENT)
Dept: SCHEDULING | Facility: HOME HEALTH | Age: 64
End: 2019-10-04
Payer: MEDICAID

## 2019-10-04 ENCOUNTER — HOME CARE VISIT (OUTPATIENT)
Dept: HOME HEALTH SERVICES | Facility: HOME HEALTH | Age: 64
End: 2019-10-04
Payer: MEDICAID

## 2019-10-04 VITALS
HEART RATE: 100 BPM | SYSTOLIC BLOOD PRESSURE: 120 MMHG | TEMPERATURE: 98.2 F | DIASTOLIC BLOOD PRESSURE: 78 MMHG | OXYGEN SATURATION: 98 %

## 2019-10-04 PROCEDURE — G0152 HHCP-SERV OF OT,EA 15 MIN: HCPCS

## 2019-10-07 ENCOUNTER — HOME CARE VISIT (OUTPATIENT)
Dept: SCHEDULING | Facility: HOME HEALTH | Age: 64
End: 2019-10-07
Payer: MEDICAID

## 2019-10-07 PROCEDURE — G0157 HHC PT ASSISTANT EA 15: HCPCS

## 2019-10-07 PROCEDURE — G0152 HHCP-SERV OF OT,EA 15 MIN: HCPCS

## 2019-10-08 ENCOUNTER — HOME CARE VISIT (OUTPATIENT)
Dept: SCHEDULING | Facility: HOME HEALTH | Age: 64
End: 2019-10-08
Payer: MEDICAID

## 2019-10-08 VITALS
TEMPERATURE: 98.4 F | OXYGEN SATURATION: 98 % | HEART RATE: 91 BPM | DIASTOLIC BLOOD PRESSURE: 78 MMHG | SYSTOLIC BLOOD PRESSURE: 118 MMHG

## 2019-10-08 VITALS
DIASTOLIC BLOOD PRESSURE: 68 MMHG | OXYGEN SATURATION: 98 % | TEMPERATURE: 98 F | SYSTOLIC BLOOD PRESSURE: 112 MMHG | RESPIRATION RATE: 16 BRPM | HEART RATE: 101 BPM

## 2019-10-08 PROCEDURE — G0299 HHS/HOSPICE OF RN EA 15 MIN: HCPCS

## 2019-10-09 ENCOUNTER — HOME CARE VISIT (OUTPATIENT)
Dept: SCHEDULING | Facility: HOME HEALTH | Age: 64
End: 2019-10-09
Payer: MEDICAID

## 2019-10-09 ENCOUNTER — HOME CARE VISIT (OUTPATIENT)
Dept: HOME HEALTH SERVICES | Facility: HOME HEALTH | Age: 64
End: 2019-10-09
Payer: MEDICAID

## 2019-10-09 PROCEDURE — G0152 HHCP-SERV OF OT,EA 15 MIN: HCPCS

## 2019-10-09 PROCEDURE — G0151 HHCP-SERV OF PT,EA 15 MIN: HCPCS

## 2019-10-10 VITALS
OXYGEN SATURATION: 98 % | SYSTOLIC BLOOD PRESSURE: 110 MMHG | HEART RATE: 95 BPM | DIASTOLIC BLOOD PRESSURE: 74 MMHG | TEMPERATURE: 98.2 F

## 2019-10-10 VITALS
DIASTOLIC BLOOD PRESSURE: 70 MMHG | HEART RATE: 98 BPM | TEMPERATURE: 98 F | OXYGEN SATURATION: 99 % | SYSTOLIC BLOOD PRESSURE: 112 MMHG

## 2019-10-11 ENCOUNTER — HOME CARE VISIT (OUTPATIENT)
Dept: SCHEDULING | Facility: HOME HEALTH | Age: 64
End: 2019-10-11
Payer: MEDICAID

## 2019-10-11 PROCEDURE — G0152 HHCP-SERV OF OT,EA 15 MIN: HCPCS

## 2019-10-13 VITALS
TEMPERATURE: 98.2 F | OXYGEN SATURATION: 98 % | DIASTOLIC BLOOD PRESSURE: 70 MMHG | SYSTOLIC BLOOD PRESSURE: 102 MMHG | HEART RATE: 82 BPM
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
## (undated) DEVICE — ENDOSCOPY PUMP TUBING/ CAP SET: Brand: ERBE

## (undated) DEVICE — FLUFF AND POLYMER UNDERPAD,EXTRA HEAVY: Brand: WINGS

## (undated) DEVICE — SYR 50ML SLIP TIP NSAF LF STRL --

## (undated) DEVICE — AIRLIFE™ NASAL OXYGEN CANNULA CURVED, FLARED TIP WITH 14 FOOT (4.3 M) CRUSH-RESISTANT TUBING, OVER-THE-EAR STYLE: Brand: AIRLIFE™

## (undated) DEVICE — FLEX ADVANTAGE 3000CC: Brand: FLEX ADVANTAGE

## (undated) DEVICE — MEDI-VAC SUCTION HIGH CAPACITY: Brand: CARDINAL HEALTH

## (undated) DEVICE — SOLUTION IRRIG 1000ML H2O STRL BLT

## (undated) DEVICE — CATHETER SUCT TR FL TIP 14FR W/ O CTRL

## (undated) DEVICE — BASIN EMESIS 500CC ROSE 250/CS 60/PLT: Brand: MEDEGEN MEDICAL PRODUCTS, LLC

## (undated) DEVICE — GAUZE SPONGES,16 PLY: Brand: CURITY

## (undated) DEVICE — MEDI-VAC NON-CONDUCTIVE SUCTION TUBING: Brand: CARDINAL HEALTH

## (undated) DEVICE — BITE BLOCK ENDOSCP UNIV AD 6 TO 9.4 MM

## (undated) DEVICE — STERILE POLYISOPRENE POWDER-FREE SURGICAL GLOVES: Brand: PROTEXIS

## (undated) DEVICE — SYRINGE MED 25GA 3ML L5/8IN SUBQ PLAS W/ DETACH NDL SFTY